# Patient Record
Sex: FEMALE | Race: BLACK OR AFRICAN AMERICAN | NOT HISPANIC OR LATINO | Employment: FULL TIME | URBAN - METROPOLITAN AREA
[De-identification: names, ages, dates, MRNs, and addresses within clinical notes are randomized per-mention and may not be internally consistent; named-entity substitution may affect disease eponyms.]

---

## 2023-03-07 ENCOUNTER — HOSPITAL ENCOUNTER (EMERGENCY)
Facility: HOSPITAL | Age: 48
Discharge: HOME | End: 2023-03-07
Attending: FAMILY MEDICINE

## 2023-03-07 VITALS
WEIGHT: 138.88 LBS | SYSTOLIC BLOOD PRESSURE: 181 MMHG | HEART RATE: 87 BPM | TEMPERATURE: 98 F | RESPIRATION RATE: 16 BRPM | DIASTOLIC BLOOD PRESSURE: 94 MMHG | HEIGHT: 64 IN | OXYGEN SATURATION: 99 % | BODY MASS INDEX: 23.71 KG/M2

## 2023-03-07 DIAGNOSIS — M25.462 BILATERAL KNEE EFFUSIONS: ICD-10-CM

## 2023-03-07 DIAGNOSIS — M25.469 PAIN AND SWELLING OF KNEE: ICD-10-CM

## 2023-03-07 DIAGNOSIS — M25.569 PAIN AND SWELLING OF KNEE: ICD-10-CM

## 2023-03-07 DIAGNOSIS — M25.461 BILATERAL KNEE EFFUSIONS: ICD-10-CM

## 2023-03-07 DIAGNOSIS — M25.50 POLYARTHRALGIA: Primary | ICD-10-CM

## 2023-03-07 DIAGNOSIS — D64.9 ANEMIA, UNSPECIFIED TYPE: ICD-10-CM

## 2023-03-07 LAB
ALBUMIN SERPL-MCNC: 2.5 G/DL (ref 3.5–5)
ALBUMIN/GLOB SERPL: 0.5 RATIO (ref 1.1–2)
ALP SERPL-CCNC: 66 UNIT/L (ref 40–150)
ALT SERPL-CCNC: <5 UNIT/L (ref 0–55)
AST SERPL-CCNC: 8 UNIT/L (ref 5–34)
BASOPHILS # BLD AUTO: 0.02 X10(3)/MCL (ref 0–0.2)
BASOPHILS NFR BLD AUTO: 0.2 %
BILIRUBIN DIRECT+TOT PNL SERPL-MCNC: 0.3 MG/DL
BUN SERPL-MCNC: 13 MG/DL (ref 7–18.7)
CALCIUM SERPL-MCNC: 9.4 MG/DL (ref 8.4–10.2)
CHLORIDE SERPL-SCNC: 109 MMOL/L (ref 98–107)
CO2 SERPL-SCNC: 24 MMOL/L (ref 22–29)
CREAT SERPL-MCNC: 0.78 MG/DL (ref 0.55–1.02)
CRP SERPL-MCNC: 99.6 MG/L
EOSINOPHIL # BLD AUTO: 0.08 X10(3)/MCL (ref 0–0.9)
EOSINOPHIL NFR BLD AUTO: 0.8 %
ERYTHROCYTE [DISTWIDTH] IN BLOOD BY AUTOMATED COUNT: 18.6 % (ref 11.5–17)
ERYTHROCYTE [SEDIMENTATION RATE] IN BLOOD: 103 MM/HR (ref 0–20)
GFR SERPLBLD CREATININE-BSD FMLA CKD-EPI: >60 MLS/MIN/1.73/M2
GLOBULIN SER-MCNC: 4.8 GM/DL (ref 2.4–3.5)
GLUCOSE SERPL-MCNC: 99 MG/DL (ref 74–100)
HCT VFR BLD AUTO: 29.6 % (ref 37–47)
HGB BLD-MCNC: 8.9 G/DL (ref 12–16)
IMM GRANULOCYTES # BLD AUTO: 0.03 X10(3)/MCL (ref 0–0.04)
IMM GRANULOCYTES NFR BLD AUTO: 0.3 %
LYMPHOCYTES # BLD AUTO: 1.27 X10(3)/MCL (ref 0.6–4.6)
LYMPHOCYTES NFR BLD AUTO: 13 %
MCH RBC QN AUTO: 23 PG
MCHC RBC AUTO-ENTMCNC: 30.1 G/DL (ref 33–36)
MCV RBC AUTO: 76.5 FL (ref 80–94)
MONOCYTES # BLD AUTO: 0.42 X10(3)/MCL (ref 0.1–1.3)
MONOCYTES NFR BLD AUTO: 4.3 %
NEUTROPHILS # BLD AUTO: 7.96 X10(3)/MCL (ref 2.1–9.2)
NEUTROPHILS NFR BLD AUTO: 81.4 %
NRBC BLD AUTO-RTO: 0 %
PLATELET # BLD AUTO: 591 X10(3)/MCL (ref 130–400)
PMV BLD AUTO: 8.7 FL (ref 7.4–10.4)
POTASSIUM SERPL-SCNC: 3.4 MMOL/L (ref 3.5–5.1)
PROT SERPL-MCNC: 7.3 GM/DL (ref 6.4–8.3)
RBC # BLD AUTO: 3.87 X10(6)/MCL (ref 4.2–5.4)
SODIUM SERPL-SCNC: 141 MMOL/L (ref 136–145)
WBC # SPEC AUTO: 9.8 X10(3)/MCL (ref 4.5–11.5)

## 2023-03-07 PROCEDURE — 99284 EMERGENCY DEPT VISIT MOD MDM: CPT

## 2023-03-07 PROCEDURE — 63600175 PHARM REV CODE 636 W HCPCS: Performed by: PHYSICIAN ASSISTANT

## 2023-03-07 PROCEDURE — 85025 COMPLETE CBC W/AUTO DIFF WBC: CPT | Performed by: PHYSICIAN ASSISTANT

## 2023-03-07 PROCEDURE — 80053 COMPREHEN METABOLIC PANEL: CPT | Performed by: PHYSICIAN ASSISTANT

## 2023-03-07 PROCEDURE — 85651 RBC SED RATE NONAUTOMATED: CPT | Performed by: PHYSICIAN ASSISTANT

## 2023-03-07 PROCEDURE — 96372 THER/PROPH/DIAG INJ SC/IM: CPT | Performed by: PHYSICIAN ASSISTANT

## 2023-03-07 PROCEDURE — 86140 C-REACTIVE PROTEIN: CPT | Performed by: PHYSICIAN ASSISTANT

## 2023-03-07 RX ORDER — METHYLPREDNISOLONE 4 MG/1
TABLET ORAL
Qty: 1 EACH | Refills: 0 | Status: SHIPPED | OUTPATIENT
Start: 2023-03-07 | End: 2023-03-24 | Stop reason: ALTCHOICE

## 2023-03-07 RX ORDER — KETOROLAC TROMETHAMINE 10 MG/1
10 TABLET, FILM COATED ORAL EVERY 6 HOURS
Qty: 20 TABLET | Refills: 0 | Status: SHIPPED | OUTPATIENT
Start: 2023-03-07 | End: 2023-03-24 | Stop reason: ALTCHOICE

## 2023-03-07 RX ORDER — HYDROCODONE BITARTRATE AND ACETAMINOPHEN 10; 325 MG/1; MG/1
1 TABLET ORAL EVERY 6 HOURS PRN
Qty: 12 TABLET | Refills: 0 | Status: SHIPPED | OUTPATIENT
Start: 2023-03-07 | End: 2023-03-24 | Stop reason: ALTCHOICE

## 2023-03-07 RX ORDER — KETOROLAC TROMETHAMINE 30 MG/ML
30 INJECTION, SOLUTION INTRAMUSCULAR; INTRAVENOUS
Status: COMPLETED | OUTPATIENT
Start: 2023-03-07 | End: 2023-03-07

## 2023-03-07 RX ADMIN — KETOROLAC TROMETHAMINE 30 MG: 30 INJECTION, SOLUTION INTRAMUSCULAR; INTRAVENOUS at 09:03

## 2023-03-07 NOTE — DISCHARGE INSTRUCTIONS
Take all medications as prescribed.     Drink plenty of fluids and get a lot of rest.     Keep scheduled appointment on 3/24/23 at [facility] Medicine clinic at 9:30 AM.    Return to ER for any changes or worsening of symptoms.

## 2023-03-07 NOTE — ED PROVIDER NOTES
Encounter Date: 3/7/2023       History     Chief Complaint   Patient presents with    Knee Pain     PT W CO BILAT KNEE PAIN > 2 MONTHS.  DENIES INJURY.  NO PCP.     46 YO AAF in ER with complaints of multiple joint pains (both knees, both wrists/hands, both ankles) with swelling and stiffness X 2-3 months. She has been seen at 2 different urgent care facilities. [facility] in 01/23 and [facility] urgent care in 02/23. She has written notes from both places which share a concern for autoimmune issues and a request for PCP appt with [facility] internal medicine clinic. Pt has no PCP and takes no medications except the occasional Tylenol or Advil. Denies injury/trauma, fever, chills, chest pain, SOB, abdominal pain, N/V/D, HA or dizziness. No other complaints.     The history is provided by the patient.   Review of patient's allergies indicates:  No Known Allergies  History reviewed. No pertinent past medical history.  History reviewed. No pertinent surgical history.  History reviewed. No pertinent family history.  Social History     Tobacco Use    Smoking status: Former     Packs/day: 0.50     Types: Cigarettes    Smokeless tobacco: Never   Substance Use Topics    Drug use: Not Currently     Review of Systems   Constitutional:  Negative for chills and fever.   HENT:  Negative for congestion and sore throat.    Respiratory:  Negative for shortness of breath.    Cardiovascular:  Negative for chest pain.   Gastrointestinal:  Negative for abdominal pain, diarrhea, nausea and vomiting.   Genitourinary:  Negative for dysuria.   Musculoskeletal:  Positive for arthralgias, joint swelling and neck pain. Negative for back pain and neck stiffness.   Skin:  Negative for rash.   Neurological:  Negative for dizziness, weakness, light-headedness and headaches.   Hematological:  Does not bruise/bleed easily.   All other systems reviewed and are negative.    Physical Exam     Initial Vitals [03/07/23 0816]   BP Pulse Resp Temp SpO2    (!) 181/94 87 16 97.9 °F (36.6 °C) 99 %      MAP       --         Physical Exam    Nursing note and vitals reviewed.  Constitutional: She appears well-developed and well-nourished. She is not diaphoretic. No distress.   HENT:   Head: Normocephalic and atraumatic.   Nose: Nose normal.   Eyes: Conjunctivae are normal.   Cardiovascular:  Normal rate, regular rhythm, normal heart sounds and intact distal pulses.           Pulmonary/Chest: Breath sounds normal. No respiratory distress.   Musculoskeletal:      Right wrist: Swelling and tenderness present. No deformity or bony tenderness. Normal range of motion. Normal pulse.      Left wrist: Swelling and tenderness present. No deformity or bony tenderness. Normal range of motion. Normal pulse.      Right knee: Swelling and effusion present. No erythema, ecchymosis or crepitus. Normal range of motion. Tenderness present over the medial joint line. Normal pulse.      Left knee: Swelling and effusion present. No erythema, ecchymosis or crepitus. Normal range of motion. Tenderness present over the medial joint line. Normal pulse.      Right ankle: Swelling present. No deformity. Tenderness present over the lateral malleolus and medial malleolus. Normal range of motion. Normal pulse.      Left ankle: Swelling present. No deformity. Tenderness present over the lateral malleolus and medial malleolus. Normal range of motion. Normal pulse.      Right foot: Normal.      Left foot: Normal.      Comments: Ttp soft tissues of both hands and wrists with mild swelling noted, no signs of septic joints, normal ROM in all fields, decreased  strength BUE, bilateral knees with effusions and ttp medially, normal ROM in flexion and extension, no signs of septic joint, both ankles with mild swelling laterally with ttp in soft tissues, good pedal pulse BLE and normal capillary refill to BUE and BLE     Neurological: She is alert and oriented to person, place, and time. She has normal  strength. GCS score is 15. GCS eye subscore is 4. GCS verbal subscore is 5. GCS motor subscore is 6.   Skin: Skin is warm and dry. Capillary refill takes less than 2 seconds.   Psychiatric: She has a normal mood and affect.       ED Course   Procedures  Labs Reviewed   COMPREHENSIVE METABOLIC PANEL - Abnormal; Notable for the following components:       Result Value    Potassium Level 3.4 (*)     Chloride 109 (*)     Albumin Level 2.5 (*)     Globulin 4.8 (*)     Albumin/Globulin Ratio 0.5 (*)     All other components within normal limits   C-REACTIVE PROTEIN - Abnormal; Notable for the following components:    C-Reactive Protein 99.60 (*)     All other components within normal limits   SEDIMENTATION RATE, AUTOMATED - Abnormal; Notable for the following components:    Sed Rate 103 (*)     All other components within normal limits   CBC WITH DIFFERENTIAL - Abnormal; Notable for the following components:    RBC 3.87 (*)     Hgb 8.9 (*)     Hct 29.6 (*)     MCV 76.5 (*)     MCHC 30.1 (*)     RDW 18.6 (*)     Platelet 591 (*)     All other components within normal limits   CBC W/ AUTO DIFFERENTIAL    Narrative:     The following orders were created for panel order CBC auto differential.  Procedure                               Abnormality         Status                     ---------                               -----------         ------                     CBC with Differential[[id number]]        Abnormal            Final result                 Please view results for these tests on the individual orders.          Imaging Results              X-Ray Knee 3 View Bilateral (Final result)  Result time 03/07/23 10:15:44      Final result by Carlos Cleveland MD (03/07/23 10:15:44)                   Impression:      Mild degenerative changes of the knees with bilateral joint effusions.      Electronically signed by: Carlos Cleveland  Date:    03/07/2023  Time:    10:15               Narrative:    EXAMINATION:  XR KNEE 3 VIEW  BILATERAL    CLINICAL HISTORY:  Pain in unspecified knee    COMPARISON:  None    FINDINGS:  Three views bilateral knees.  There is no fracture or dislocation.  Mild degenerative changes bilateral knees but no significant joint space narrowing.  There appear to be moderate bilateral suprapatellar effusions.                                       Medications   ketorolac injection 30 mg (30 mg Intramuscular Given 3/7/23 0934)                 ED Course as of 03/07/23 1124   Tue Mar 07, 2023   1045 VSS, NAD, pt is non-toxic or ill appearing, labs and imaging reviewed with pt, pt noted to have significantly elevated inflammatory markers today concerning for autoimmune disease, no signs of septic joint on exam, pt has upcoming IM appointment on 3/24/23 with Katharina Lundberg NP, will give Rx for NSAIDs and steroids today to help with pain, treatment plan and discharge instructions including follow up discussed, pt verbalized understanding, all questions answered, pt is stable and ready for discharge  [TT]      ED Course User Index  [TT] CRISTY Weston                 Clinical Impression:   Final diagnoses:  [M25.569, M25.469] Pain and swelling of knee  [M25.50] Polyarthralgia (Primary)  [M25.461, M25.462] Bilateral knee effusions  [D64.9] Anemia, unspecified type        ED Disposition Condition    Discharge Stable          ED Prescriptions       Medication Sig Dispense Start Date End Date Auth. Provider    methylPREDNISolone (MEDROL DOSEPACK) 4 mg tablet Take as package instructs 1 each 3/7/2023 -- CRISTY Weston    ketorolac (TORADOL) 10 mg tablet Take 1 tablet (10 mg total) by mouth every 6 (six) hours. for 5 days 20 tablet 3/7/2023 3/12/2023 CRISTY Weston    HYDROcodone-acetaminophen (NORCO)  mg per tablet Take 1 tablet by mouth every 6 (six) hours as needed for Pain. 12 tablet 3/7/2023 -- CRISTY Weston          Follow-up Information       Follow up With Specialties Details Why Contact Info [facility]  [place] [facility] Emergency Medicine In 3 days As needed, If symptoms worsen [address] [place] [id number] [phone]    Katharina Price, [facility] Go on 3/24/2023 at 9:30AM [address] [facility] [address]  [phone]               CRISTY Weston  03/07/23 1124

## 2023-03-24 ENCOUNTER — OFFICE VISIT (OUTPATIENT)
Dept: ORTHOPEDICS | Facility: CLINIC | Age: 48
End: 2023-03-24

## 2023-03-24 ENCOUNTER — LAB VISIT (OUTPATIENT)
Dept: LAB | Facility: HOSPITAL | Age: 48
End: 2023-03-24

## 2023-03-24 ENCOUNTER — OFFICE VISIT (OUTPATIENT)
Dept: INTERNAL MEDICINE | Facility: CLINIC | Age: 48
End: 2023-03-24

## 2023-03-24 VITALS
HEART RATE: 91 BPM | DIASTOLIC BLOOD PRESSURE: 108 MMHG | OXYGEN SATURATION: 100 % | TEMPERATURE: 98 F | SYSTOLIC BLOOD PRESSURE: 142 MMHG | WEIGHT: 114.19 LBS | BODY MASS INDEX: 19.5 KG/M2 | RESPIRATION RATE: 18 BRPM | HEIGHT: 64 IN

## 2023-03-24 VITALS — WEIGHT: 114 LBS | BODY MASS INDEX: 19.46 KG/M2 | HEIGHT: 64 IN

## 2023-03-24 DIAGNOSIS — I10 PRIMARY HYPERTENSION: ICD-10-CM

## 2023-03-24 DIAGNOSIS — Z13.0 SCREENING FOR IRON DEFICIENCY ANEMIA: ICD-10-CM

## 2023-03-24 DIAGNOSIS — M25.461 BILATERAL KNEE EFFUSIONS: Primary | ICD-10-CM

## 2023-03-24 DIAGNOSIS — R79.82 ELEVATED C-REACTIVE PROTEIN (CRP): ICD-10-CM

## 2023-03-24 DIAGNOSIS — Z11.59 SCREENING FOR VIRAL DISEASE: ICD-10-CM

## 2023-03-24 DIAGNOSIS — M25.50 POLYARTHRALGIA: ICD-10-CM

## 2023-03-24 DIAGNOSIS — Z13.220 SCREENING FOR LIPID DISORDERS: ICD-10-CM

## 2023-03-24 DIAGNOSIS — R70.0 ELEVATED SED RATE: ICD-10-CM

## 2023-03-24 DIAGNOSIS — Z11.4 SCREENING FOR HIV (HUMAN IMMUNODEFICIENCY VIRUS): ICD-10-CM

## 2023-03-24 DIAGNOSIS — Z13.21 ENCOUNTER FOR VITAMIN DEFICIENCY SCREENING: ICD-10-CM

## 2023-03-24 DIAGNOSIS — M25.462 BILATERAL KNEE EFFUSIONS: ICD-10-CM

## 2023-03-24 DIAGNOSIS — Z13.29 SCREENING FOR THYROID DISORDER: ICD-10-CM

## 2023-03-24 DIAGNOSIS — M25.50 POLYARTHRALGIA: Primary | ICD-10-CM

## 2023-03-24 DIAGNOSIS — R53.1 RAPIDLY PROGRESSIVE WEAKNESS: ICD-10-CM

## 2023-03-24 DIAGNOSIS — M25.461 BILATERAL KNEE EFFUSIONS: ICD-10-CM

## 2023-03-24 DIAGNOSIS — M25.462 BILATERAL KNEE EFFUSIONS: Primary | ICD-10-CM

## 2023-03-24 DIAGNOSIS — Z11.3 ROUTINE SCREENING FOR STI (SEXUALLY TRANSMITTED INFECTION): ICD-10-CM

## 2023-03-24 DIAGNOSIS — Z13.1 SCREENING FOR DIABETES MELLITUS: ICD-10-CM

## 2023-03-24 LAB
ALBUMIN SERPL-MCNC: 2.7 G/DL (ref 3.5–5)
ALBUMIN/GLOB SERPL: 0.5 RATIO (ref 1.1–2)
ALP SERPL-CCNC: 69 UNIT/L (ref 40–150)
ALT SERPL-CCNC: <5 UNIT/L (ref 0–55)
AST SERPL-CCNC: 12 UNIT/L (ref 5–34)
BASOPHILS # BLD AUTO: 0.02 X10(3)/MCL (ref 0–0.2)
BASOPHILS NFR BLD AUTO: 0.3 %
BILIRUBIN DIRECT+TOT PNL SERPL-MCNC: 0.3 MG/DL
BUN SERPL-MCNC: 8.8 MG/DL (ref 7–18.7)
C3 SERPL-MCNC: 136 MG/DL (ref 80–173)
C4 SERPL-MCNC: 23.7 MG/DL (ref 13–46)
CALCIUM SERPL-MCNC: 9.9 MG/DL (ref 8.4–10.2)
CHLORIDE SERPL-SCNC: 104 MMOL/L (ref 98–107)
CHOLEST SERPL-MCNC: 227 MG/DL
CHOLEST/HDLC SERPL: 8 {RATIO} (ref 0–5)
CLARITY BODY FLUID (OHS): NORMAL
CO2 SERPL-SCNC: 24 MMOL/L (ref 22–29)
COLOR BODY FLUID (OHS): NORMAL
CREAT FLD-MCNC: 0.63 MG/DL
CREAT SERPL-MCNC: 0.83 MG/DL (ref 0.55–1.02)
CRP SERPL-MCNC: 109.9 MG/L
DEPRECATED CALCIDIOL+CALCIFEROL SERPL-MC: 7.1 NG/ML (ref 30–80)
EOSINOPHIL # BLD AUTO: 0.03 X10(3)/MCL (ref 0–0.9)
EOSINOPHIL NFR BLD AUTO: 0.4 %
ERYTHROCYTE [DISTWIDTH] IN BLOOD BY AUTOMATED COUNT: 18.9 % (ref 11.5–17)
ERYTHROCYTE [SEDIMENTATION RATE] IN BLOOD: >130 MM/HR (ref 0–20)
EST. AVERAGE GLUCOSE BLD GHB EST-MCNC: 102.5 MG/DL
FERRITIN SERPL-MCNC: 222.95 NG/ML (ref 4.63–204)
FOLATE SERPL-MCNC: 6.6 NG/ML (ref 7–31.4)
GFR SERPLBLD CREATININE-BSD FMLA CKD-EPI: >60 MLS/MIN/1.73/M2
GLOBULIN SER-MCNC: 5.6 GM/DL (ref 2.4–3.5)
GLUCOSE FLD-MCNC: 49 MG/DL
GLUCOSE SERPL-MCNC: 108 MG/DL (ref 74–100)
HAV IGM SERPL QL IA: NONREACTIVE
HBA1C MFR BLD: 5.2 %
HBV CORE AB SERPL QL IA: NONREACTIVE
HBV CORE IGM SERPL QL IA: REACTIVE
HBV SURFACE AB SER-ACNC: 0.43 MIU/ML
HBV SURFACE AB SERPL IA-ACNC: NONREACTIVE M[IU]/ML
HBV SURFACE AG SERPL QL IA: NONREACTIVE
HCT VFR BLD AUTO: 27.6 % (ref 37–47)
HCV AB SERPL QL IA: NONREACTIVE
HDLC SERPL-MCNC: 29 MG/DL (ref 35–60)
HGB BLD-MCNC: 8.3 G/DL (ref 12–16)
HIV 1+2 AB+HIV1 P24 AG SERPL QL IA: NONREACTIVE
IMM GRANULOCYTES # BLD AUTO: 0.03 X10(3)/MCL (ref 0–0.04)
IMM GRANULOCYTES NFR BLD AUTO: 0.4 %
IRON SATN MFR SERPL: 6 % (ref 20–50)
IRON SERPL-MCNC: 10 UG/DL (ref 50–170)
LDLC SERPL CALC-MCNC: 165 MG/DL (ref 50–140)
LYMPHOCYTE MANUAL BF (OHS): 5 %
LYMPHOCYTES # BLD AUTO: 0.95 X10(3)/MCL (ref 0.6–4.6)
LYMPHOCYTES NFR BLD AUTO: 13.5 %
MCH RBC QN AUTO: 21.9 PG (ref 27–31)
MCHC RBC AUTO-ENTMCNC: 30.1 G/DL (ref 33–36)
MCV RBC AUTO: 72.8 FL (ref 80–94)
MONOCYTE MANUAL BF (OHS): 2 %
MONOCYTES # BLD AUTO: 0.31 X10(3)/MCL (ref 0.1–1.3)
MONOCYTES NFR BLD AUTO: 4.4 %
NEUTROPHILS # BLD AUTO: 5.69 X10(3)/MCL (ref 2.1–9.2)
NEUTROPHILS MAN BF (OHS): 93 %
NEUTROPHILS NFR BLD AUTO: 81 %
NRBC BLD AUTO-RTO: 0 %
PATH REV: NORMAL
PLATELET # BLD AUTO: 707 X10(3)/MCL (ref 130–400)
PMV BLD AUTO: 8.8 FL (ref 7.4–10.4)
POTASSIUM SERPL-SCNC: 3.2 MMOL/L (ref 3.5–5.1)
PROT FLD-MCNC: 5 GM/DL
PROT SERPL-MCNC: 8.3 GM/DL (ref 6.4–8.3)
PTH-INTACT SERPL-MCNC: 36.4 PG/ML (ref 8.7–77)
RBC # BLD AUTO: 3.79 X10(6)/MCL (ref 4.2–5.4)
RBC COUNT BODY FLUID (OHS): NORMAL /UL
SODIUM SERPL-SCNC: 139 MMOL/L (ref 136–145)
T PALLIDUM AB SER QL: NONREACTIVE
T4 FREE SERPL-MCNC: 1.24 NG/DL (ref 0.7–1.48)
TIBC SERPL-MCNC: 171 UG/DL (ref 70–310)
TIBC SERPL-MCNC: 181 UG/DL (ref 250–450)
TRANSFERRIN SERPL-MCNC: 161 MG/DL (ref 180–382)
TRIGL SERPL-MCNC: 165 MG/DL (ref 37–140)
TSH SERPL-ACNC: 0.83 UIU/ML (ref 0.35–4.94)
URATE SERPL-MCNC: 4.2 MG/DL (ref 2.6–6)
VIT B12 SERPL-MCNC: 390 PG/ML (ref 213–816)
VLDLC SERPL CALC-MCNC: 33 MG/DL
WBC # FLD AUTO: NORMAL /UL
WBC # SPEC AUTO: 7 X10(3)/MCL (ref 4.5–11.5)

## 2023-03-24 PROCEDURE — 82570 ASSAY OF URINE CREATININE: CPT

## 2023-03-24 PROCEDURE — 87798 DETECT AGENT NOS DNA AMP: CPT | Mod: 90

## 2023-03-24 PROCEDURE — 84443 ASSAY THYROID STIM HORMONE: CPT

## 2023-03-24 PROCEDURE — 84439 ASSAY OF FREE THYROXINE: CPT

## 2023-03-24 PROCEDURE — 99214 PR OFFICE/OUTPT VISIT, EST, LEVL IV, 30-39 MIN: ICD-10-PCS | Mod: 25,S$PBB,, | Performed by: NURSE PRACTITIONER

## 2023-03-24 PROCEDURE — 83550 IRON BINDING TEST: CPT

## 2023-03-24 PROCEDURE — 86003 ALLG SPEC IGE CRUDE XTRC EA: CPT

## 2023-03-24 PROCEDURE — 87389 HIV-1 AG W/HIV-1&-2 AB AG IA: CPT

## 2023-03-24 PROCEDURE — 99205 OFFICE O/P NEW HI 60 MIN: CPT | Mod: S$PBB,,,

## 2023-03-24 PROCEDURE — 83516 IMMUNOASSAY NONANTIBODY: CPT | Mod: 90

## 2023-03-24 PROCEDURE — 82607 VITAMIN B-12: CPT

## 2023-03-24 PROCEDURE — 99205 PR OFFICE/OUTPT VISIT, NEW, LEVL V, 60-74 MIN: ICD-10-PCS | Mod: S$PBB,,,

## 2023-03-24 PROCEDURE — 99214 OFFICE O/P EST MOD 30 MIN: CPT | Mod: 25,S$PBB,, | Performed by: NURSE PRACTITIONER

## 2023-03-24 PROCEDURE — 36415 COLL VENOUS BLD VENIPUNCTURE: CPT

## 2023-03-24 PROCEDURE — 84550 ASSAY OF BLOOD/URIC ACID: CPT

## 2023-03-24 PROCEDURE — 87205 SMEAR GRAM STAIN: CPT

## 2023-03-24 PROCEDURE — 89060 EXAM SYNOVIAL FLUID CRYSTALS: CPT

## 2023-03-24 PROCEDURE — 99215 OFFICE O/P EST HI 40 MIN: CPT | Mod: PBBFAC,27,25

## 2023-03-24 PROCEDURE — 83970 ASSAY OF PARATHORMONE: CPT

## 2023-03-24 PROCEDURE — 86780 TREPONEMA PALLIDUM: CPT

## 2023-03-24 PROCEDURE — 85025 COMPLETE CBC W/AUTO DIFF WBC: CPT

## 2023-03-24 PROCEDURE — 82746 ASSAY OF FOLIC ACID SERUM: CPT

## 2023-03-24 PROCEDURE — 85651 RBC SED RATE NONAUTOMATED: CPT

## 2023-03-24 PROCEDURE — 84157 ASSAY OF PROTEIN OTHER: CPT

## 2023-03-24 PROCEDURE — 87070 CULTURE OTHR SPECIMN AEROBIC: CPT

## 2023-03-24 PROCEDURE — 80061 LIPID PANEL: CPT

## 2023-03-24 PROCEDURE — 82306 VITAMIN D 25 HYDROXY: CPT

## 2023-03-24 PROCEDURE — 86140 C-REACTIVE PROTEIN: CPT

## 2023-03-24 PROCEDURE — 80074 ACUTE HEPATITIS PANEL: CPT

## 2023-03-24 PROCEDURE — 86160 COMPLEMENT ANTIGEN: CPT

## 2023-03-24 PROCEDURE — 84560 ASSAY OF URINE/URIC ACID: CPT | Mod: 90

## 2023-03-24 PROCEDURE — 83036 HEMOGLOBIN GLYCOSYLATED A1C: CPT

## 2023-03-24 PROCEDURE — 80053 COMPREHEN METABOLIC PANEL: CPT

## 2023-03-24 PROCEDURE — 86706 HEP B SURFACE ANTIBODY: CPT

## 2023-03-24 PROCEDURE — 99213 OFFICE O/P EST LOW 20 MIN: CPT | Mod: PBBFAC,25 | Performed by: NURSE PRACTITIONER

## 2023-03-24 PROCEDURE — 89051 BODY FLUID CELL COUNT: CPT

## 2023-03-24 PROCEDURE — 86747 PARVOVIRUS ANTIBODY: CPT | Mod: 90

## 2023-03-24 PROCEDURE — 20610 DRAIN/INJ JOINT/BURSA W/O US: CPT | Mod: 50,PBBFAC | Performed by: NURSE PRACTITIONER

## 2023-03-24 PROCEDURE — 82728 ASSAY OF FERRITIN: CPT

## 2023-03-24 PROCEDURE — 82945 GLUCOSE OTHER FLUID: CPT

## 2023-03-24 PROCEDURE — 86036 ANCA SCREEN EACH ANTIBODY: CPT | Mod: 90

## 2023-03-24 PROCEDURE — 20610 LARGE JOINT ASPIRATION/INJECTION: BILATERAL KNEE: ICD-10-PCS | Mod: 50,S$PBB,, | Performed by: NURSE PRACTITIONER

## 2023-03-24 PROCEDURE — 86704 HEP B CORE ANTIBODY TOTAL: CPT

## 2023-03-24 RX ORDER — TRAMADOL HYDROCHLORIDE 50 MG/1
50 TABLET ORAL EVERY 6 HOURS PRN
Qty: 28 TABLET | Refills: 0 | Status: ON HOLD | OUTPATIENT
Start: 2023-03-24 | End: 2023-03-30 | Stop reason: HOSPADM

## 2023-03-24 RX ORDER — TRAMADOL HYDROCHLORIDE 50 MG/1
50 TABLET ORAL EVERY 6 HOURS PRN
Qty: 28 TABLET | Refills: 0 | Status: SHIPPED | OUTPATIENT
Start: 2023-03-24 | End: 2023-03-24

## 2023-03-24 RX ORDER — METHOCARBAMOL 750 MG/1
750 TABLET, FILM COATED ORAL 3 TIMES DAILY PRN
COMMUNITY
Start: 2023-01-25 | End: 2023-03-24

## 2023-03-24 RX ORDER — LIDOCAINE HYDROCHLORIDE 10 MG/ML
5 INJECTION, SOLUTION EPIDURAL; INFILTRATION; INTRACAUDAL; PERINEURAL
Status: COMPLETED | OUTPATIENT
Start: 2023-03-24 | End: 2023-03-24

## 2023-03-24 RX ORDER — AMLODIPINE BESYLATE 5 MG/1
5 TABLET ORAL DAILY
Qty: 90 TABLET | Refills: 1 | Status: SHIPPED | OUTPATIENT
Start: 2023-03-24 | End: 2023-03-24

## 2023-03-24 RX ORDER — AMLODIPINE BESYLATE 5 MG/1
5 TABLET ORAL DAILY
Qty: 90 TABLET | Refills: 1 | Status: ON HOLD | OUTPATIENT
Start: 2023-03-24 | End: 2023-03-30 | Stop reason: HOSPADM

## 2023-03-24 RX ADMIN — LIDOCAINE HYDROCHLORIDE 5 ML: 10 INJECTION, SOLUTION EPIDURAL; INFILTRATION; INTRACAUDAL; PERINEURAL at 10:03

## 2023-03-24 NOTE — PROGRESS NOTES
"  Subjective:       New patient .  Patient ID: Nela Shaver is a 47 y.o. female. Non-smoker. Employment HX: book keeper, currently employed.    Seen [facility] ortho for same DX since n/a.   Chief Complaint: Pain of the Left Knee and Pain of the Right Knee    HPI:    Bilateral L > R  pressure  global knee pain.   Injury: no known injury  Onset: several weeks ago worsening over time  Modifying Factors: worse with activity and improves with rest  Associated Symptoms: decreased ROM and swelling   Activity: normal activity without exercise or sports activity and pain severely interferes with ADLs   Previous Treatments:  RX NSAIDs without symptom relief  PMH: negative for contraindications for NSAID use  Family History: - OA    Note: New patient walk in after PCP request assistance with bilateral joint drainage s/t severe ROM, pain.  Patient with history of diffuse joint pain.  PCP requesting drainage with lab testing for work up.      Current Outpatient Medications:     amLODIPine (NORVASC) 5 MG tablet, Take 1 tablet (5 mg total) by mouth once daily., Disp: 90 tablet, Rfl: 1    traMADoL (ULTRAM) 50 mg tablet, Take 1 tablet (50 mg total) by mouth every 6 (six) hours as needed for Pain., Disp: 28 tablet, Rfl: 0    Current Facility-Administered Medications:     LIDOcaine (PF) 10 mg/ml (1%) injection 50 mg, 5 mL, Other, 1 time in Clinic/HOD, Rajani Marrufo NP    LIDOcaine (PF) 10 mg/ml (1%) injection 50 mg, 5 mL, Other, 1 time in Clinic/HOD, Rajani Marrufo NP  Review of patient's allergies indicates:  No Known Allergies  No results found for: HGBA1C   Body mass index is 19.57 kg/m².   Vitals:    03/24/23 1153 03/24/23 1154   Weight: 51.7 kg (114 lb)    Height: 5' 4" (1.626 m)    PainSc:  10-Worst pain ever      Review of Systems:  A ten-point review of systems was performed and is negative, except as mentioned above   Objective:   MSK Bilateral Knee  General:  No apparent distress, no pain indicators, well nourished "   Inspection: moderate effusion, partial weight bearing- wheelchair in use in clinic today , no erythema, no contusion, mild quad deconditioning, no varus deformity   Palpation: BILATERAL knees tenderness with palpation at medial joint line, peripatellar soft tissue, non-tender patellar tendon, tibial plateau  ROM:    Active Flexion / Extension (0-140):  3 / 90 painful (R)  2 / 85 painful (L)  Strength:   Flexion  4 / 5 (R)   4 / 5 (L)  Extension  4 / 5 (R)   4 / 5 (L)  Special Testing:  IT Band Testing  Karen's Test:    -- (R)  -- (L)  Effusion Testing  Ballotable Effusion:   + (R)  + (L)  Fluid Wave:    + (R)  + (L)  Patellar Testing  Patellar Grind:    -- (R)  -- (L)  Patellar Facet Pain:   -- (R)  -- (L)  Apprehension:    -- (R)  -- (L)  Meniscal Testing  Fiorella's test:     -- (R)  -- (L)  Thessaly's Test:      Not tested (R)  not tested (L)  Ligament Testing  ACL Anterior Drawer:   -- (R) -- (L)  PCL Posterior Drawer:   -- (R) -- (L)  LCL Varus Test:    -- (R) -- (L)  MCL Valgus Test:   -- (R) -- (L)  Hip Exam normal  Ankle Exam normal  Neurovascular: Intact to light touch  Neuro/ Psych: Awake, alert, oriented, normal mood and affect  Lymphatic: No LAD  Skin/ Soft Tissue: no rash, skin intact  Assessment:       Imaging: X-ray 3 views of right and left knee dated 3/24/23 reviewed and independently interpreted by me.  Discussed with patient. Noted no acute, DJD noted.    XR KNEE 3 VIEW BILATERAL 3/7/23  CLINICAL HISTORY:  Pain in unspecified knee  COMPARISON:  None  FINDINGS:  Three views bilateral knees.  There is no fracture or dislocation.  Mild degenerative changes bilateral knees but no significant joint space narrowing.  There appear to be moderate bilateral suprapatellar effusions.  Impression:  Mild degenerative changes of the knees with bilateral joint effusions.    EMR Review: completed with noted  PCP visit 3/24/23 and ED visit 3/7/23    1. Bilateral knee effusions    2. Adult BMI <19 kg/sq m       Plan:       Orders Placed This Encounter    LIDOcaine (PF) 10 mg/ml (1%) injection 50 mg    LIDOcaine (PF) 10 mg/ml (1%) injection 50 mg     Ongoing education about DX and treatment recommendations including conservative treatments of daily HEP with TheraBand, muscle strengthening with use of stationary bike (RPM set at 80 or > with slow progression to goal of 40 minutes 3-4 times per week as tolerated), adequate vit D/C, glucosamine 1500 mg/day and daily acetaminophen 1000 mg 3 times/ day if able to tolerate.    Treatment plan: Mild knee arthritis with moderate joint effusion Bilateral L > R Refer back to PCP. Symptoms suggest inflammatory arthropathy; recommend rheumatological workup to include minimum: CBC/D, CMP, Uric acid, UA, ESR, CRP, RF, anti-CCP, MAGED, HLAB 27. If laboratory findings suggestive of inflammatory arthropathy recommend referral to Rheumatology for definitive diagnosis and treatment options.  I recommend bilateral knee joint drainage with lab workup ordered.    Procedure: Drainage of bilateral knees discussed, s/t failed conservative treatments patient consents to Drainage for diagnostic purposes today.    RX Medications: continue medications as RX per PCP  RTC: PRN if symptoms worsen or return  NOTE:  PCP will f/u with lab results and referral to RA if found to be appropriate. Patient verbalized understanding and denies questions.     Rajani Marrufo FNP    Large Joint Aspiration/Injection: bilateral knee    Date/Time: 3/24/2023 10:30 AM  Performed by: Rajani Marrufo NP  Authorized by: Rajani Marrufo NP     Consent Done?:  Yes (Written)  Indications:  Pain and joint swelling  Site marked: the procedure site was marked    Timeout: prior to procedure the correct patient, procedure, and site was verified      Local anesthesia used?: Yes    Local anesthetic:  Topical anesthetic    Details:  Needle Size:  18 G  Approach:  Anterolateral  Location:  Knee  Laterality:   Bilateral  Site:  Bilateral knee  Medications (Right):  5 mL Lidocaine 1% (PF) 5 mL  Aspirate (Right) amount (mL):  20  Aspirate (Right):  Bloody  Medications (Left):  5 mL Lidocaine 1% (PF) 5 mL  Aspirate (Left) amount (mL):  30  Aspirate (Left):  Bloody  Patient tolerance:  Patient tolerated the procedure well with no immediate complications     Ortho Procedure Note   Procedure: Bilateral  Knee Drainage Only lateral suprapatellar approach   Date: 03/24/2023  Time: 10:30 AM CDT   Indications: Therapeutic Indication - Decrease pain, Increase range of motion and improve quality of life:   Risks:  Possible complications with the injection include bleeding, infection (.01%), tendon rupture, fat pad or soft tissue atrophy, allergic reaction to medications and vasovagal response. Consent:  Procedure, risks, benefits, and alternatives explained the patient, who voiced understanding and agreed to proceed with procedure.  Consent signed and scanned into the medical record.     Staff: Rajani BRUNSON  Description:  Time-out performed.  The patient was prepped in normal sterile fashion use of chlorhexidine scrub and the appropriate and anatomic landmarks were identified.  Sterile 18 gauge 1.5 inch  was used. Topical ethyl chloride was applied.  Complications: None   EBL: None   Post Procedure: Patient alert, and moving all extremities. ROM improved, pain decreased.  Good peripheral pulses, no signs of vascular compromise and range of motion intact.  Aftercare instructions were given to patient at time of discharge.  Relative rest for 3 days-avoiding excess activity.  Place ice on the area for 15 minutes every 4-6 hours. Patient may take Tylenol a 1000 mg b.i.d. or ibuprofen 600 mg t.i.d. for the next 3-4 days if not on medication already and safe to take pending co-morbidities.  Protect the area for the next 1-8 hours if anesthetic was used.  Avoid excessive activity for the next 3-4 weeks.  ER precautions given for  fever, severe joint pain or allergic reaction or other new symptoms related to the joint injection.         Timed Billing Note  Total Time Spent with Patient: 30 minutes Excludes Time Spent Performing Procedure  Visit Start Time: 1200  10 minutes spent prior to visit reviewing EMR, prior labs and x-rays.  10 minutes spent in visit with patient face-to-face time completing exam, obtaining history, educating on DX and treatment plan.  10 minutes spent after visit completing EMR documentation.   Visit End Time: 1230

## 2023-03-24 NOTE — PROGRESS NOTES
PATIENT NAME: Nela Shaver  : 1975  DATE: 3/24/23  MRN: [id number]          Reason for Visit/Chief Complaint   Establish Care, Knee Pain (Bilateral), Depression, Anxiety, and Extremity Weakness       History of Present Illness (HPI)     Nela Shaver is a 47 y.o. Black female presenting in clinic today Establish Care, Knee Pain (Bilateral), Depression, Anxiety, and Extremity Weakness. No previous PCP. No significant medical history. Raoul (son) is present during visit.    Patient presented to [facility] ED on 3/7/2023 with complaints of multiple joint pains (both knees, both wrists/hands, both ankles) with swelling and stiffness X 2-3 months. She was previously seen at 2 different urgent care facilities. [facility] in  and [facility] urgent care in . She states that the urgent cares she went to had concerns of an autoimmune issue. She denies any family history of autoimmune diseases. In the ED, CRP-99.60, Sed rate-103, K-3.4, Platelets-591. Bilateral knee xrays revealed: Three views bilateral knees. There is no fracture or dislocation.  Mild degenerative changes bilateral knees but no significant joint space narrowing.  There appear to be moderate bilateral suprapatellar effusions. Impression: Mild degenerative changes of the knees with bilateral joint effusions. She was given ketorolac IM, prescribed medrol dose pack, ketorolac by mouth, and norco. She is a poor historian. She states in 10/2022, she woke up one morning with back pain, but the pain has gotten progressively worse. In January, she states the pain started to affect her ability to walk and that is when all of the weakness started. She presents in a wheelchair today. She states she does not have a wheelchair at home and just lays in bed. She reports having urinary accidents due to unable to make it to the restroom quick enough. She reports joint stiffness through out the day. 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 She is voicing concern of potentially losing her job. She states she does not have any benefits there. She reports when she goes to work, she has to take Ibuprofen 600 mg every 4 hours for the pain. She also reports pain in her hands. She states at the beginning of the month, she was able to open a bottle of water, but now she is unable to. In 10/2022 when this started, she denies any travel (locally or abroad), denies any insect bites, falls, injuries.     BP, 171/118, 142/108 (manual) not at goal. She denies HA, CP, SOB, dizziness, or vision changes. She states at the different urgent cares she visited, she has been hypertensive, but she did not have a PCP to follow up.     PHQ9-12 and GAD7-7. She denies SI/HI. She is very concerned about her condition, she stated "I feel like I am dying." Denies counseling, psychiatry, or medication management at this time.     Former cigarette smoker, denies alcohol use, denies illicit drug use. Denies chest pain, shortness of breath, cough, headache, dizziness, weakness, abdominal pain, nausea, vomiting, diarrhea, constipation, dysuria, SI, and HI.    Pt has 1 or more chronic illnesses with severe exacerbation, progression, or side effects of treatment / 1 acute or chronic illness or injury that poses a threat to life or bodily function.  I have reviewed prior external notes / reviewed results of each unique test /ordered a unique test /  This assessment required an independent historian.  Management of this patient was discussed with an external physician / other qhp / or appropriate source that was not separately reported.  There is high risk of morbidity from additional diagnostic testing or treatment including prescription drug management. Diagnosis & treatment are significantly limited by social determinants of health.   I spent a total of 77 minutes reviewing the patient's chart prior to our face to face time, seeing the patient, speaking with nurse, " "and documenting in the record.  (Npt 60-74 mins / Est 40-54 mins)        Review of Systems     Review of Systems   Constitutional: Negative.    HENT: Negative.  Negative for trouble swallowing.    Eyes: Negative.    Respiratory: Negative.     Cardiovascular: Negative.    Gastrointestinal: Negative.    Endocrine: Negative.    Genitourinary: Negative.    Musculoskeletal:  Positive for arthralgias, back pain, gait problem, joint swelling, neck pain and neck stiffness.   Skin: Negative.    Allergic/Immunologic: Negative.    Neurological:  Positive for weakness. Negative for dizziness, speech difficulty and headaches.   Hematological: Negative.    Psychiatric/Behavioral:  Positive for dysphoric mood. Negative for self-injury and suicidal ideas. The patient is nervous/anxious.    All other systems reviewed and are negative.    Medical / Social / Family History   History reviewed. No pertinent past medical history.      History reviewed. No pertinent surgical history.      Social History  Nela Shaver's  reports that she has been smoking cigarettes. She has been smoking an average of .5 packs per day. She has never used smokeless tobacco. She reports that she does not currently use alcohol. She reports that she does not use drugs.    Family History  Nela Shaver's family history includes Hyperlipidemia in her mother.    Medications and Allergies     Medications  Current Outpatient Medications   Medication Instructions    HYDROcodone-acetaminophen (NORCO)  mg per tablet 1 tablet, Oral, Every 6 hours PRN    methocarbamoL (ROBAXIN) 750 mg, Oral, 3 times daily PRN    methylPREDNISolone (MEDROL DOSEPACK) 4 mg tablet Take as package instructs       Allergies  Review of patient's allergies indicates:  No Known Allergies    Physical Examination   Visit Vitals  BP (!) 142/108 (BP Location: Left arm, Patient Position: Sitting, BP Method: Small (Manual))   Pulse 91   Temp 98.2 °F (36.8 °C) (Oral)   Resp 18   Ht 5' 4" (1.626 m) "   Wt 51.8 kg (114 lb 3.2 oz)   LMP 02/27/2023   SpO2 100%   BMI 19.60 kg/m²     Physical Exam  Vitals reviewed.   Constitutional:       Appearance: Normal appearance. She is normal weight. She is ill-appearing.   HENT:      Head: Normocephalic and atraumatic.      Right Ear: External ear normal.      Left Ear: External ear normal.      Nose: Nose normal.      Mouth/Throat:      Mouth: Mucous membranes are moist.      Pharynx: Oropharynx is clear.   Eyes:      Extraocular Movements: Extraocular movements intact.      Conjunctiva/sclera: Conjunctivae normal.      Pupils: Pupils are equal, round, and reactive to light.   Cardiovascular:      Rate and Rhythm: Normal rate and regular rhythm.      Pulses: Normal pulses.      Heart sounds: Normal heart sounds.   Pulmonary:      Effort: Pulmonary effort is normal.      Breath sounds: Normal breath sounds.   Abdominal:      General: Bowel sounds are normal.      Palpations: Abdomen is soft.   Musculoskeletal:      Right wrist: No crepitus. Decreased range of motion.      Left wrist: No crepitus. Decreased range of motion.      Right hand: Swelling and tenderness present. Decreased range of motion. Decreased strength. Normal capillary refill. Normal pulse.      Left hand: Swelling and tenderness present. Decreased range of motion. Decreased strength. Normal capillary refill. Normal pulse.      Cervical back: Normal range of motion and neck supple.      Right knee: Swelling and effusion present. No crepitus. Normal range of motion. Tenderness present.      Left knee: Swelling and effusion present. No crepitus. Normal range of motion. Tenderness present.      Comments: Wheelchair. Bilateral knees warm to touch. Able to have full extension of knees with passive ROM.    Skin:     General: Skin is warm and dry.      Capillary Refill: Capillary refill takes less than 2 seconds.   Neurological:      General: No focal deficit present.      Mental Status: She is alert and oriented to  person, place, and time.   Psychiatric:         Mood and Affect: Mood normal. Affect is tearful.         Behavior: Behavior normal.         Thought Content: Thought content normal.         Judgment: Judgment normal.         Results     Lab Results   Component Value Date    WBC 9.8 03/07/2023    RBC 3.87 (L) 03/07/2023    HGB 8.9 (L) 03/07/2023    HCT 29.6 (L) 03/07/2023    MCV 76.5 (L) 03/07/2023    MCH 23.0 03/07/2023    MCHC 30.1 (L) 03/07/2023    RDW 18.6 (H) 03/07/2023     (H) 03/07/2023    MPV 8.7 03/07/2023      Lab Results   Component Value Date     03/07/2023    K 3.4 (L) 03/07/2023    CHLORIDE 109 (H) 03/07/2023    CO2 24 03/07/2023    GLUCOSE 99 03/07/2023    BUN 13.0 03/07/2023    CREATININE 0.78 03/07/2023    LABPROT 7.3 03/07/2023    ALBUMIN 2.5 (L) 03/07/2023    BILITOT 0.3 03/07/2023    ALKPHOS 66 03/07/2023    AST 8 03/07/2023    ALT <5 03/07/2023    EGFRNORACEVR >60 03/07/2023     No results found for: TSH, T4FREE  No results found for: CHOL, HDL, LDL, TRIG  No results found for: COLORUA, SGUA, PHURINE, PROTEINUA, GLUCOSEUA, BILIRUBINUA, BLOODUA, WBCUA, RBCUA, BACTERIA, NITRITESUA, LEUKOCYTESUR, UROBILINOGEN, PHURINE  No results found for: MICROALBCREA, CREATRANDUR, MICALBCREAT  No results found for: YOFOOTKV72SH, V12, FOLATE  No results found for: HIV, HEPAIGM, HEPBCOREM, HEPBSAG, HEPCAB  No results found for: FITDIAG, COLOGUARD  No results found for: OCCBLDIA    Assessment and Plan (including Health Maintenance)     1. Polyarthralgia  - CBC Auto Differential; Future  - Comprehensive Metabolic Panel; Future  - Microalbumin/Creatinine Ratio, Urine; Future  - Urinalysis, Reflex to Urine Culture; Future  - CYCLIC CITRULLINATED PEPTIDE (CCP) ANTIBODY; Future  - Uric Acid; Future  - C4 Complement; Future  - Rheumatoid Factors, IgA, IgG, IgM; Future  - PTH, Intact; Future  - Parvovirus B19 Antibody, IgG and IgM; Future  - Lyme disease DNA probe, direct; Future  - Hepatitis B Surface Ab,  Qualitative; Future  - Hepatitis B Core Antibody, Total; Future  - Anti-Neutrophilic Cytoplasmic Antibody; Future  - Vitamin B12; Future  - Folate; Future  - C3 Complement; Future  - Ambulatory referral/consult to Orthopedics; Future  - traMADoL (ULTRAM) 50 mg tablet; Take 1 tablet (50 mg total) by mouth every 6 (six) hours as needed for Pain.  Dispense: 28 tablet; Refill: 0  - Ambulatory referral/consult to Outpatient Case Management  Will refer to appropriate specialties when labs are resulted.    2. Primary hypertension  - amLODIPine (NORVASC) 5 MG tablet; Take 1 tablet (5 mg total) by mouth once daily.  Dispense: 90 tablet; Refill: 1  - blood pressure monitor Kit; 1 each by Misc.(Non-Drug; Combo Route) route 2 (two) times a day.  Dispense: 1 each; Refill: 0  - Ambulatory referral/consult to Outpatient Case Management - (voucher for BP cuff).  BP Readings from Last 3 Encounters:   03/24/23 (!) 142/108   03/07/23 (!) 181/94      Not at goal. New diagnosis.  Follow a low sodium (less than 2 grams of sodium per day), DASH diet.   Rx amlodipine, take as prescribed.  Monitor blood pressure and report any consistent values greater than 140/90 and keep a log.  Encouraged smoking cessation to aid in BP reduction and co-morbidities.   Maintain healthy weight with a BMI goal of <30.   Aerobic exercise for 150 minutes per week (or 5 days a week for 30 minutes each day).     3. Rapidly progressive weakness  - CT Head Without Contrast; Future  - X-Ray Chest PA And Lateral; Future  - Ambulatory referral/consult to Outpatient Case Management - assistance at home, mobility devices.    4. Elevated C-reactive protein (CRP)  - C-Reactive Protein; Future - to be completed after clinic.    5. Elevated sed rate  - Sedimentation rate; Future - to be completed after clinic.    6. Bilateral knee effusions  - Ambulatory referral/consult to Orthopedics; Future  Consulted with Dr. Huynh in ED, therapeutic knee joint drainage is not  performed in ED.   Consulted with BOY Giron (ortho) and agreed to do joint drainage with fluid samples to be sent to lab for further work up.   Patient and son was assisted to [facility] orthopedic clinic.    7. Body mass index (BMI) of 19.0 to 19.9 in adult  Body mass index is 19.6 kg/m². (At goal).     8. Routine screening for STI (sexually transmitted infection)  - Chlamydia/GC, PCR; Future  - SYPHILIS ANTIBODY (WITH REFLEX RPR); Future    9. Screening for diabetes mellitus  - Hemoglobin A1C; Future    10. Screening for HIV (human immunodeficiency virus)  - HIV 1/2 Ag/Ab (4th Gen); Future    11. Screening for viral disease  - Hepatitis Panel, Acute; Future    12. Screening for iron deficiency anemia  - Ferritin; Future  - Iron and TIBC; Future    13. Screening for lipid disorders  - Lipid Panel; Future    14. Screening for thyroid disorder  - T4, Free; Future  - TSH; Future    15. Encounter for vitamin deficiency screening  - Vitamin D; Future    16. Motor neuron disease, unspecified  - CT Head Without Contrast; Future      Health Maintenance Due   Topic Date Due    Hepatitis C Screening  Never done    Cervical Cancer Screening  Never done    Lipid Panel  Never done    COVID-19 Vaccine (1) Never done    HIV Screening  Never done    Mammogram  Never done    Colorectal Cancer Screening  Never done     Tests to Keep You Healthy    Mammogram: DUE  Colon Cancer Screening: DUE  Cervical Cancer Screening: DUE      Health Maintenance Topics with due status: Not Due       Topic Last Completion Date    Lipid Panel 03/24/2023       Future Appointments   Date Time Provider Department Center   4/10/2023 12:45 PM BOY Reaves [facility]        Follow up in about 2 weeks (around 4/7/2023) for F2F, Lab review, Follow up, Wellness.  RTC PRN.  Labs within a week of visit.        Signature:        BOY Reaves  [facility]  [address]  [facility] [id number]    Date of encounter: 3/24/23

## 2023-03-25 PROBLEM — I10 PRIMARY HYPERTENSION: Status: ACTIVE | Noted: 2023-03-25

## 2023-03-25 LAB
GRAM STN SPEC: NORMAL
GRAM STN SPEC: NORMAL

## 2023-03-25 RX ORDER — ACETAMINOPHEN 500 MG
1 TABLET ORAL 2 TIMES DAILY
Qty: 1 EACH | Refills: 0 | Status: SHIPPED | OUTPATIENT
Start: 2023-03-25

## 2023-03-27 ENCOUNTER — PATIENT OUTREACH (OUTPATIENT)
Dept: ADMINISTRATIVE | Facility: OTHER | Age: 48
End: 2023-03-27

## 2023-03-27 LAB
B BURGDOR DNA SPEC QL NAA+PROBE: NEGATIVE
B GAR+AFZ OPPA1 BLD QL NAA+NON-PROBE: NEGATIVE
B MAYONII OPPA1 BLD QL NAA+NON-PROBE: NEGATIVE
C-ANCA TITR SER IF: NEGATIVE {TITER}
CRYSTAL, BODY FLUID (OHS): NORMAL
CRYSTAL, BODY FLUID TYPE (OHS): NORMAL
MICROBIOLOGIST REVIEW: NORMAL
P-ANCA SER QL IF: NEGATIVE
RF IGA SER-ACNC: >100 UNITS
RF IGG SER-ACNC: 46 UNITS
RF IGM SER-ACNC: >100 UNITS
SLIDE CRYSTALS (OHS): NORMAL
SPECIMEN SOURCE: NORMAL
URATE FLD-MCNC: 3.5 MG/DL

## 2023-03-27 NOTE — PROGRESS NOTES
CHW - Case Closure    This Community Health Worker spoke to patient via telephone today.     Pt/Caregiver reported: Pt declined needing community resources     Pt/Caregiver denied any additional needs at this time and agrees with episode closure at this time.  Provided patient with Community Health Worker's contact information and encouraged him/her to contact this Community Health Worker if additional needs arise.

## 2023-03-27 NOTE — PROGRESS NOTES
"CHW - Initial Contact    This Community Health Worker updated the Social Determinant of Health questionnaire with patient via telephone today.      Pt identified barriers of most importance are: None during call Pt stated she doesn't need help with food transportation and etc. She was just in pain and wants to "be fixed" CHW offered to send an in-basket message to BOY Price and Call [facility] Nurse on Call Pt declined both. Pt stated she will wait until her appointment on wedsnday to address her pain.    Referrals to community agencies completed with patient/caregiver consent outside of [facility] include: n/a    Referrals were put through [facility] - n/a    Support and Services: No support & services have been documented.  Other information discussed the patient needs / wants help with: none    Follow up required: no    No future outreach task assigned     "

## 2023-03-28 ENCOUNTER — HOSPITAL ENCOUNTER (INPATIENT)
Facility: HOSPITAL | Age: 48
LOS: 2 days | Discharge: HOME | DRG: 546 | End: 2023-03-30
Attending: FAMILY MEDICINE | Admitting: INTERNAL MEDICINE

## 2023-03-28 DIAGNOSIS — I10 UNCONTROLLED HYPERTENSION: ICD-10-CM

## 2023-03-28 DIAGNOSIS — D75.839 THROMBOCYTOSIS: ICD-10-CM

## 2023-03-28 DIAGNOSIS — M06.9 RHEUMATOID ARTHRITIS FLARE: Primary | ICD-10-CM

## 2023-03-28 DIAGNOSIS — I10 PRIMARY HYPERTENSION: ICD-10-CM

## 2023-03-28 DIAGNOSIS — R29.898 WEAKNESS OF BOTH LOWER EXTREMITIES: ICD-10-CM

## 2023-03-28 DIAGNOSIS — R70.0 ELEVATED ERYTHROCYTE SEDIMENTATION RATE: ICD-10-CM

## 2023-03-28 DIAGNOSIS — D50.9 IRON DEFICIENCY ANEMIA, UNSPECIFIED IRON DEFICIENCY ANEMIA TYPE: ICD-10-CM

## 2023-03-28 DIAGNOSIS — R79.82 ELEVATED C-REACTIVE PROTEIN (CRP): ICD-10-CM

## 2023-03-28 LAB
ALBUMIN SERPL-MCNC: 2.3 G/DL (ref 3.5–5)
ALBUMIN/GLOB SERPL: 0.4 RATIO (ref 1.1–2)
ALP SERPL-CCNC: 101 UNIT/L (ref 40–150)
ALT SERPL-CCNC: 19 UNIT/L (ref 0–55)
APPEARANCE UR: CLEAR
AST SERPL-CCNC: 44 UNIT/L (ref 5–34)
B-HCG UR QL: NEGATIVE
B19V IGG SER QL IA: ABNORMAL
B19V IGG+IGM SER-IMP: ABNORMAL
B19V IGM SER QL IA: NEGATIVE
BACTERIA #/AREA URNS AUTO: ABNORMAL /HPF
BASOPHILS # BLD AUTO: 0.02 X10(3)/MCL (ref 0–0.2)
BASOPHILS NFR BLD AUTO: 0.3 %
BILIRUB UR QL STRIP.AUTO: NEGATIVE MG/DL
BILIRUBIN DIRECT+TOT PNL SERPL-MCNC: 0.4 MG/DL
BUN SERPL-MCNC: 8.4 MG/DL (ref 7–18.7)
CALCIUM SERPL-MCNC: 9.7 MG/DL (ref 8.4–10.2)
CHLORIDE SERPL-SCNC: 98 MMOL/L (ref 98–107)
CO2 SERPL-SCNC: 27 MMOL/L (ref 22–29)
COLOR UR AUTO: ABNORMAL
CREAT SERPL-MCNC: 0.69 MG/DL (ref 0.55–1.02)
CRP SERPL-MCNC: 202.2 MG/L
CTP QC/QA: YES
CYCLIC CITRULLINATED PEPTIDE (CCP) (OLG): POSITIVE
EOSINOPHIL # BLD AUTO: 0.05 X10(3)/MCL (ref 0–0.9)
EOSINOPHIL NFR BLD AUTO: 0.7 %
ERYTHROCYTE [DISTWIDTH] IN BLOOD BY AUTOMATED COUNT: 18.9 % (ref 11.5–17)
ERYTHROCYTE [SEDIMENTATION RATE] IN BLOOD: 73 MM/HR (ref 0–20)
GFR SERPLBLD CREATININE-BSD FMLA CKD-EPI: >60 MLS/MIN/1.73/M2
GLOBULIN SER-MCNC: 5.7 GM/DL (ref 2.4–3.5)
GLUCOSE SERPL-MCNC: 110 MG/DL (ref 74–100)
GLUCOSE UR QL STRIP.AUTO: NORMAL MG/DL
HCT VFR BLD AUTO: 25.9 % (ref 37–47)
HGB BLD-MCNC: 7.7 G/DL (ref 12–16)
HIV 1+2 AB+HIV1 P24 AG SERPL QL IA: NONREACTIVE
HYALINE CASTS #/AREA URNS LPF: ABNORMAL /LPF
IMM GRANULOCYTES # BLD AUTO: 0.02 X10(3)/MCL (ref 0–0.04)
IMM GRANULOCYTES NFR BLD AUTO: 0.3 %
KETONES UR QL STRIP.AUTO: ABNORMAL MG/DL
LEUKOCYTE ESTERASE UR QL STRIP.AUTO: 25 UNIT/L
LYMPHOCYTES # BLD AUTO: 0.97 X10(3)/MCL (ref 0.6–4.6)
LYMPHOCYTES NFR BLD AUTO: 14.3 %
MCH RBC QN AUTO: 21.2 PG (ref 27–31)
MCHC RBC AUTO-ENTMCNC: 29.7 G/DL (ref 33–36)
MCV RBC AUTO: 71.3 FL (ref 80–94)
MONOCYTES # BLD AUTO: 0.55 X10(3)/MCL (ref 0.1–1.3)
MONOCYTES NFR BLD AUTO: 8.1 %
MUCOUS THREADS URNS QL MICRO: ABNORMAL /LPF
NEUTROPHILS # BLD AUTO: 5.15 X10(3)/MCL (ref 2.1–9.2)
NEUTROPHILS NFR BLD AUTO: 76.3 %
NITRITE UR QL STRIP.AUTO: NEGATIVE
NRBC BLD AUTO-RTO: 0 %
PH UR STRIP.AUTO: 7.5 [PH]
PLATELET # BLD AUTO: 748 X10(3)/MCL (ref 130–400)
PMV BLD AUTO: 8.5 FL (ref 7.4–10.4)
POTASSIUM SERPL-SCNC: 3.3 MMOL/L (ref 3.5–5.1)
PROT SERPL-MCNC: 8 GM/DL (ref 6.4–8.3)
PROT UR QL STRIP.AUTO: ABNORMAL MG/DL
RBC # BLD AUTO: 3.63 X10(6)/MCL (ref 4.2–5.4)
RBC #/AREA URNS AUTO: ABNORMAL /HPF
RBC UR QL AUTO: NEGATIVE UNIT/L
SODIUM SERPL-SCNC: 135 MMOL/L (ref 136–145)
SP GR UR STRIP.AUTO: 1.01
SQUAMOUS #/AREA URNS LPF: ABNORMAL /HPF
UROBILINOGEN UR STRIP-ACNC: ABNORMAL MG/DL
WBC # SPEC AUTO: 6.8 X10(3)/MCL (ref 4.5–11.5)
WBC #/AREA URNS AUTO: ABNORMAL /HPF

## 2023-03-28 PROCEDURE — 63600175 PHARM REV CODE 636 W HCPCS: Performed by: PHYSICIAN ASSISTANT

## 2023-03-28 PROCEDURE — 96375 TX/PRO/DX INJ NEW DRUG ADDON: CPT

## 2023-03-28 PROCEDURE — 86235 NUCLEAR ANTIGEN ANTIBODY: CPT | Performed by: STUDENT IN AN ORGANIZED HEALTH CARE EDUCATION/TRAINING PROGRAM

## 2023-03-28 PROCEDURE — 86039 ANTINUCLEAR ANTIBODIES (ANA): CPT | Mod: 90 | Performed by: STUDENT IN AN ORGANIZED HEALTH CARE EDUCATION/TRAINING PROGRAM

## 2023-03-28 PROCEDURE — 80053 COMPREHEN METABOLIC PANEL: CPT | Performed by: PHYSICIAN ASSISTANT

## 2023-03-28 PROCEDURE — 99285 EMERGENCY DEPT VISIT HI MDM: CPT | Mod: 25

## 2023-03-28 PROCEDURE — 96374 THER/PROPH/DIAG INJ IV PUSH: CPT

## 2023-03-28 PROCEDURE — 87389 HIV-1 AG W/HIV-1&-2 AB AG IA: CPT | Performed by: STUDENT IN AN ORGANIZED HEALTH CARE EDUCATION/TRAINING PROGRAM

## 2023-03-28 PROCEDURE — 96376 TX/PRO/DX INJ SAME DRUG ADON: CPT

## 2023-03-28 PROCEDURE — 85651 RBC SED RATE NONAUTOMATED: CPT | Performed by: PHYSICIAN ASSISTANT

## 2023-03-28 PROCEDURE — 25000003 PHARM REV CODE 250: Performed by: STUDENT IN AN ORGANIZED HEALTH CARE EDUCATION/TRAINING PROGRAM

## 2023-03-28 PROCEDURE — 81001 URINALYSIS AUTO W/SCOPE: CPT | Performed by: PHYSICIAN ASSISTANT

## 2023-03-28 PROCEDURE — 25000003 PHARM REV CODE 250: Performed by: FAMILY MEDICINE

## 2023-03-28 PROCEDURE — 11000001 HC ACUTE MED/SURG PRIVATE ROOM

## 2023-03-28 PROCEDURE — 86140 C-REACTIVE PROTEIN: CPT | Performed by: PHYSICIAN ASSISTANT

## 2023-03-28 PROCEDURE — 85025 COMPLETE CBC W/AUTO DIFF WBC: CPT | Performed by: PHYSICIAN ASSISTANT

## 2023-03-28 PROCEDURE — 81025 URINE PREGNANCY TEST: CPT | Performed by: PHYSICIAN ASSISTANT

## 2023-03-28 PROCEDURE — 63600175 PHARM REV CODE 636 W HCPCS: Performed by: STUDENT IN AN ORGANIZED HEALTH CARE EDUCATION/TRAINING PROGRAM

## 2023-03-28 PROCEDURE — 25000003 PHARM REV CODE 250: Performed by: PHYSICIAN ASSISTANT

## 2023-03-28 RX ORDER — HYDRALAZINE HYDROCHLORIDE 20 MG/ML
10 INJECTION INTRAMUSCULAR; INTRAVENOUS EVERY 6 HOURS PRN
Status: DISCONTINUED | OUTPATIENT
Start: 2023-03-28 | End: 2023-03-30 | Stop reason: HOSPADM

## 2023-03-28 RX ORDER — AMLODIPINE BESYLATE 5 MG/1
5 TABLET ORAL DAILY
Status: DISCONTINUED | OUTPATIENT
Start: 2023-03-28 | End: 2023-03-30

## 2023-03-28 RX ORDER — METHYLPREDNISOLONE SOD SUCC 125 MG
125 VIAL (EA) INJECTION ONCE
Status: COMPLETED | OUTPATIENT
Start: 2023-03-28 | End: 2023-03-28

## 2023-03-28 RX ORDER — NALOXONE HCL 0.4 MG/ML
0.02 VIAL (ML) INJECTION
Status: DISCONTINUED | OUTPATIENT
Start: 2023-03-28 | End: 2023-03-30 | Stop reason: HOSPADM

## 2023-03-28 RX ORDER — IBUPROFEN 200 MG
24 TABLET ORAL
Status: DISCONTINUED | OUTPATIENT
Start: 2023-03-28 | End: 2023-03-28

## 2023-03-28 RX ORDER — OXYCODONE HYDROCHLORIDE 5 MG/1
5 TABLET ORAL EVERY 6 HOURS PRN
Status: DISCONTINUED | OUTPATIENT
Start: 2023-03-28 | End: 2023-03-29

## 2023-03-28 RX ORDER — DEXTROSE 40 %
15 GEL (GRAM) ORAL
Status: DISCONTINUED | OUTPATIENT
Start: 2023-03-28 | End: 2023-03-29

## 2023-03-28 RX ORDER — METHYLPREDNISOLONE SOD SUCC 125 MG
60 VIAL (EA) INJECTION 2 TIMES DAILY
Status: DISCONTINUED | OUTPATIENT
Start: 2023-03-29 | End: 2023-03-29

## 2023-03-28 RX ORDER — ENOXAPARIN SODIUM 100 MG/ML
40 INJECTION SUBCUTANEOUS EVERY 24 HOURS
Status: DISCONTINUED | OUTPATIENT
Start: 2023-03-28 | End: 2023-03-30 | Stop reason: HOSPADM

## 2023-03-28 RX ORDER — MORPHINE SULFATE 2 MG/ML
4 INJECTION, SOLUTION INTRAMUSCULAR; INTRAVENOUS
Status: COMPLETED | OUTPATIENT
Start: 2023-03-28 | End: 2023-03-28

## 2023-03-28 RX ORDER — LABETALOL HCL 20 MG/4 ML
10 SYRINGE (ML) INTRAVENOUS
Status: COMPLETED | OUTPATIENT
Start: 2023-03-28 | End: 2023-03-28

## 2023-03-28 RX ORDER — ERGOCALCIFEROL 1.25 MG/1
50000 CAPSULE ORAL
Status: DISCONTINUED | OUTPATIENT
Start: 2023-03-29 | End: 2023-03-30 | Stop reason: HOSPADM

## 2023-03-28 RX ORDER — IBUPROFEN 200 MG
16 TABLET ORAL
Status: DISCONTINUED | OUTPATIENT
Start: 2023-03-28 | End: 2023-03-28

## 2023-03-28 RX ORDER — POTASSIUM CHLORIDE 20 MEQ/1
40 TABLET, EXTENDED RELEASE ORAL
Status: COMPLETED | OUTPATIENT
Start: 2023-03-28 | End: 2023-03-28

## 2023-03-28 RX ORDER — DEXTROSE 40 %
15 GEL (GRAM) ORAL
Status: DISCONTINUED | OUTPATIENT
Start: 2023-03-28 | End: 2023-03-30 | Stop reason: HOSPADM

## 2023-03-28 RX ORDER — LANOLIN ALCOHOL/MO/W.PET/CERES
1 CREAM (GRAM) TOPICAL EVERY OTHER DAY
Status: DISCONTINUED | OUTPATIENT
Start: 2023-03-29 | End: 2023-03-30 | Stop reason: HOSPADM

## 2023-03-28 RX ORDER — LABETALOL HCL 20 MG/4 ML
10 SYRINGE (ML) INTRAVENOUS EVERY 6 HOURS PRN
Status: DISCONTINUED | OUTPATIENT
Start: 2023-03-28 | End: 2023-03-30 | Stop reason: HOSPADM

## 2023-03-28 RX ORDER — SODIUM CHLORIDE 0.9 % (FLUSH) 0.9 %
10 SYRINGE (ML) INJECTION EVERY 12 HOURS PRN
Status: DISCONTINUED | OUTPATIENT
Start: 2023-03-28 | End: 2023-03-30 | Stop reason: HOSPADM

## 2023-03-28 RX ORDER — ACETAMINOPHEN 325 MG/1
650 TABLET ORAL EVERY 6 HOURS PRN
Status: DISCONTINUED | OUTPATIENT
Start: 2023-03-28 | End: 2023-03-30 | Stop reason: HOSPADM

## 2023-03-28 RX ORDER — MORPHINE SULFATE 2 MG/ML
2 INJECTION, SOLUTION INTRAMUSCULAR; INTRAVENOUS
Status: COMPLETED | OUTPATIENT
Start: 2023-03-28 | End: 2023-03-28

## 2023-03-28 RX ORDER — GLUCAGON 1 MG
1 KIT INJECTION
Status: DISCONTINUED | OUTPATIENT
Start: 2023-03-28 | End: 2023-03-29

## 2023-03-28 RX ADMIN — METHYLPREDNISOLONE SODIUM SUCCINATE 125 MG: 125 INJECTION, POWDER, FOR SOLUTION INTRAMUSCULAR; INTRAVENOUS at 07:03

## 2023-03-28 RX ADMIN — MORPHINE SULFATE 4 MG: 2 INJECTION, SOLUTION INTRAMUSCULAR; INTRAVENOUS at 04:03

## 2023-03-28 RX ADMIN — OXYCODONE HYDROCHLORIDE 5 MG: 5 TABLET ORAL at 07:03

## 2023-03-28 RX ADMIN — AMLODIPINE BESYLATE 5 MG: 5 TABLET ORAL at 07:03

## 2023-03-28 RX ADMIN — LABETALOL HYDROCHLORIDE 10 MG: 5 INJECTION, SOLUTION INTRAVENOUS at 05:03

## 2023-03-28 RX ADMIN — POTASSIUM CHLORIDE 40 MEQ: 1500 TABLET, EXTENDED RELEASE ORAL at 06:03

## 2023-03-28 RX ADMIN — MORPHINE SULFATE 2 MG: 2 INJECTION, SOLUTION INTRAMUSCULAR; INTRAVENOUS at 06:03

## 2023-03-28 RX ADMIN — LABETALOL HYDROCHLORIDE 10 MG: 5 INJECTION, SOLUTION INTRAVENOUS at 04:03

## 2023-03-28 RX ADMIN — ENOXAPARIN SODIUM 40 MG: 40 INJECTION SUBCUTANEOUS at 07:03

## 2023-03-28 NOTE — TELEPHONE ENCOUNTER
Patient states she has an appointment at 1:30 today and is unable to keep appointment but would like her Amlodipine  5 mg refilled.   Rescheduled patient for 04/10/2023 @ 12:45  Patient would like results of labs also.

## 2023-03-28 NOTE — ED PROVIDER NOTES
Encounter Date: 3/28/2023       History     Chief Complaint   Patient presents with    Joint Pain    Hypertension     PT IN /[facility] W CONTINUED JOINT PAIN, WORSE IN KNEES, NECK AND BACK. PT BP ELEVATED, REPORTS COMPLIANCE W MEDS.      48 yo F w/ PMHx significant for HTN presents to ED c/o ongoing generalized joint pain, erythema, warmth & swelling. Patient reports symptoms initially began around November of last year & have progressively gotten worse since that time. Reports pain is all over, but is worse in neck, back & knees. Seen in this ED 3/7 & had significant elevation of inflammatory markers & knee XRs which showed effusion. Patient was discharged w/ meds for symptom relief & referred to clinic for further evaluation. Patient seen in clinic 4 days ago & had extensive lab workup drawn which showed ESR >130, , anti-CCP pos & RF pos. Patient reports severe weakness in b/l LEs & states this has reached the point where she is unable to walk at all over the past 4 days. Reports that she has essentially been bedbound for past 4 days & is urinating in bed, but denies any incontinence. Reports that she has not had BM in approx 10 days, but believes this is due to little PO intake. Denies anorexia, abdominal pain, abdominal distension, blood in stool, N/V, LE numbness, LE paresthesia, LE paralysis, unexplained weight loss, night sweats, F/C, rash. Hypertensive & tachycardic on arrival. Vitals otherwise stable, patient in NAD.    Review of patient's allergies indicates:  No Known Allergies  Past Medical History:   Diagnosis Date    Arthritis      History reviewed. No pertinent surgical history.  Family History   Problem Relation Age of Onset    Hyperlipidemia Mother      Social History     Tobacco Use    Smoking status: Former     Packs/day: 0.50     Types: Cigarettes    Smokeless tobacco: Never   Substance Use Topics    Alcohol use: Not Currently    Drug use: Never     Review of Systems   HENT:  Negative for  congestion, rhinorrhea and sore throat.    Eyes:  Negative for visual disturbance.   Respiratory:  Negative for cough and shortness of breath.    Cardiovascular:  Negative for chest pain, palpitations and leg swelling.   Genitourinary:  Negative for dysuria, hematuria, vaginal bleeding and vaginal discharge.   Skin:  Negative for pallor.   Allergic/Immunologic: Negative for immunocompromised state.   Neurological:  Negative for dizziness, syncope and headaches.   Psychiatric/Behavioral:  Negative for confusion.    All other systems reviewed and are negative.    Physical Exam     Initial Vitals [03/28/23 1531]   BP Pulse Resp Temp SpO2   (!) 181/115 107 18 99.1 °F (37.3 °C) 97 %      MAP       --         Physical Exam    Nursing note and vitals reviewed.  Constitutional: She appears well-developed and well-nourished. She is not diaphoretic. No distress.   HENT:   Head: Normocephalic and atraumatic.   Mouth/Throat: Oropharynx is clear and moist. No oropharyngeal exudate.   Eyes: Conjunctivae and EOM are normal. Pupils are equal, round, and reactive to light. No scleral icterus.   Neck: Neck supple.   Normal range of motion.  Cardiovascular:  Normal rate, regular rhythm, normal heart sounds and intact distal pulses.     Exam reveals no gallop and no friction rub.       No murmur heard.  Pulmonary/Chest: Breath sounds normal. No respiratory distress. She has no wheezes. She has no rhonchi. She has no rales.   Abdominal: Abdomen is soft. Bowel sounds are normal. She exhibits no distension. There is no abdominal tenderness. There is no rebound and no guarding.   Genitourinary:    Rectum normal.   Rectum:      Guaiac result negative.      No abnormal anal tone.   Guaiac negative stool. : Acceptable.   Genitourinary Comments: Rectal exam chaperoned by Mitra Chapa RN     Musculoskeletal:         General: No edema. Normal range of motion.      Right wrist: Swelling, tenderness and bony tenderness present.  No crepitus. Normal range of motion. Normal pulse.      Left wrist: Swelling, tenderness and bony tenderness present. No crepitus. Normal range of motion. Normal pulse.      Right hand: Swelling, tenderness and bony tenderness present. Normal range of motion. Decreased strength. Normal sensation. Normal capillary refill.      Left hand: Swelling, tenderness and bony tenderness present. Normal range of motion. Decreased strength. Normal sensation. Normal capillary refill.      Cervical back: Normal range of motion and neck supple. Tenderness present. No spasms or bony tenderness. Normal range of motion.      Thoracic back: Normal.      Lumbar back: Tenderness present. No spasms or bony tenderness. Normal range of motion.      Right knee: Swelling, effusion and bony tenderness present. No deformity, erythema or ecchymosis. Normal range of motion. Tenderness present.      Left knee: Swelling, effusion and bony tenderness present. No deformity, erythema or ecchymosis. Normal range of motion. Tenderness present.      Right ankle: Swelling present. No deformity. Tenderness present. Normal range of motion.      Left ankle: Swelling present. No deformity. Tenderness present. Normal range of motion.     Neurological: She is alert and oriented to person, place, and time. She is not disoriented. She displays no tremor. No sensory deficit. She exhibits normal muscle tone. She displays no seizure activity. Coordination normal. GCS eye subscore is 4. GCS verbal subscore is 5. GCS motor subscore is 6.   Decreased strength in distal b/l LEs   Skin: Skin is warm and dry. No rash noted. There is erythema (b/l knees). No pallor.   Psychiatric: She has a normal mood and affect. Thought content normal.       ED Course   Procedures  Labs Reviewed   COMPREHENSIVE METABOLIC PANEL - Abnormal; Notable for the following components:       Result Value    Sodium Level 135 (*)     Potassium Level 3.3 (*)     Glucose Level 110 (*)     Albumin  Level 2.3 (*)     Globulin 5.7 (*)     Albumin/Globulin Ratio 0.4 (*)     Aspartate Aminotransferase 44 (*)     All other components within normal limits   SEDIMENTATION RATE, AUTOMATED - Abnormal; Notable for the following components:    Sed Rate 73 (*)     All other components within normal limits   C-REACTIVE PROTEIN - Abnormal; Notable for the following components:    C-Reactive Protein 202.20 (*)     All other components within normal limits   CBC WITH DIFFERENTIAL - Abnormal; Notable for the following components:    RBC 3.63 (*)     Hgb 7.7 (*)     Hct 25.9 (*)     MCV 71.3 (*)     MCH 21.2 (*)     MCHC 29.7 (*)     RDW 18.9 (*)     Platelet 748 (*)     All other components within normal limits   CBC W/ AUTO DIFFERENTIAL    Narrative:     The following orders were created for panel order CBC Auto Differential.  Procedure                               Abnormality         Status                     ---------                               -----------         ------                     CBC with Differential[[id number]]        Abnormal            Final result                 Please view results for these tests on the individual orders.   EXTRA TUBES    Narrative:     The following orders were created for panel order EXTRA TUBES.  Procedure                               Abnormality         Status                     ---------                               -----------         ------                     Light Blue Top Hold[[id number]]                              In process                 Gold Top Hold[[id number]]                                    In process                   Please view results for these tests on the individual orders.   LIGHT BLUE TOP HOLD   GOLD TOP HOLD   URINALYSIS, REFLEX TO URINE CULTURE   POCT URINE PREGNANCY          Imaging Results              X-Ray Abdomen AP 1 View (Final result)  Result time 03/28/23 18:00:02      Final result by Chey Nash MD (03/28/23 18:00:02)                    Impression:      No acute abdominal radiographic abnormality.      Electronically signed by: Chey Nash  Date:    03/28/2023  Time:    18:00               Narrative:    EXAMINATION:  XR ABDOMEN AP 1 VIEW    CLINICAL HISTORY:  constipation;    TECHNIQUE:  AP View(s) of the abdomen was performed.    COMPARISON:  None.    FINDINGS:  No dilated bowel loops. No evidence of obstruction.Mild stool burden.    No appreciable acute osseous abnormality.                                       Medications   potassium chloride SA CR tablet 40 mEq (has no administration in time range)   labetalol 20 mg/4 mL (5 mg/mL) IV syring (10 mg Intravenous Given 3/28/23 1619)   morphine injection 4 mg (4 mg Intravenous Given 3/28/23 1619)   labetalol 20 mg/4 mL (5 mg/mL) IV syring (10 mg Intravenous Given 3/28/23 1759)   morphine injection 2 mg (2 mg Intravenous Given 3/28/23 1810)     Medical Decision Making:   Clinical Tests:   Lab Tests: Ordered and Reviewed  Radiological Study: Ordered and Reviewed  Other:   I have discussed this case with another health care provider.       <> Summary of the Discussion: Case discussed w/ Dr. Huynh & he performed face-to-face evaluation at bedside & reviewed all diagnostic information. All of his recommendations followed.                        Clinical Impression:   Final diagnoses:  [M06.9] Rheumatoid arthritis flare (Primary)  [D75.839] Thrombocytosis  [R29.898] Weakness of both lower extremities  [R79.82] Elevated C-reactive protein (CRP)  [R70.0] Elevated erythrocyte sedimentation rate  [I10] Uncontrolled hypertension  [D50.9] Iron deficiency anemia, unspecified iron deficiency anemia type        ED Disposition Condition    Admit Stable                CRISTY Bolanos  03/28/23 4823

## 2023-03-29 LAB
ALBUMIN SERPL-MCNC: 2.2 G/DL (ref 3.5–5)
ALBUMIN/GLOB SERPL: 0.4 RATIO (ref 1.1–2)
ALP SERPL-CCNC: 103 UNIT/L (ref 40–150)
ALT SERPL-CCNC: 17 UNIT/L (ref 0–55)
AST SERPL-CCNC: 26 UNIT/L (ref 5–34)
BACTERIA FLD CULT: NORMAL
BASOPHILS # BLD AUTO: 0 X10(3)/MCL (ref 0–0.2)
BASOPHILS NFR BLD AUTO: 0 %
BILIRUBIN DIRECT+TOT PNL SERPL-MCNC: 0.3 MG/DL
BUN SERPL-MCNC: 11.7 MG/DL (ref 7–18.7)
CALCIUM SERPL-MCNC: 10.1 MG/DL (ref 8.4–10.2)
CHLORIDE SERPL-SCNC: 101 MMOL/L (ref 98–107)
CK SERPL-CCNC: 19 U/L (ref 29–168)
CO2 SERPL-SCNC: 26 MMOL/L (ref 22–29)
CREAT SERPL-MCNC: 0.76 MG/DL (ref 0.55–1.02)
EOSINOPHIL # BLD AUTO: 0 X10(3)/MCL (ref 0–0.9)
EOSINOPHIL NFR BLD AUTO: 0 %
ERYTHROCYTE [DISTWIDTH] IN BLOOD BY AUTOMATED COUNT: 19.1 % (ref 11.5–17)
GFR SERPLBLD CREATININE-BSD FMLA CKD-EPI: >60 MLS/MIN/1.73/M2
GLOBULIN SER-MCNC: 6.2 GM/DL (ref 2.4–3.5)
GLUCOSE SERPL-MCNC: 210 MG/DL (ref 74–100)
HCT VFR BLD AUTO: 28 % (ref 37–47)
HGB BLD-MCNC: 8.1 G/DL (ref 12–16)
IMM GRANULOCYTES # BLD AUTO: 0.02 X10(3)/MCL (ref 0–0.04)
IMM GRANULOCYTES NFR BLD AUTO: 0.4 %
LYMPHOCYTES # BLD AUTO: 0.53 X10(3)/MCL (ref 0.6–4.6)
LYMPHOCYTES NFR BLD AUTO: 10.3 %
MAGNESIUM SERPL-MCNC: 2 MG/DL (ref 1.6–2.6)
MCH RBC QN AUTO: 21 PG (ref 27–31)
MCHC RBC AUTO-ENTMCNC: 28.9 G/DL (ref 33–36)
MCV RBC AUTO: 72.7 FL (ref 80–94)
MONOCYTES # BLD AUTO: 0.04 X10(3)/MCL (ref 0.1–1.3)
MONOCYTES NFR BLD AUTO: 0.8 %
NEUTROPHILS # BLD AUTO: 4.54 X10(3)/MCL (ref 2.1–9.2)
NEUTROPHILS NFR BLD AUTO: 88.5 %
NRBC BLD AUTO-RTO: 0 %
PHOSPHATE SERPL-MCNC: 4.3 MG/DL (ref 2.3–4.7)
PLATELET # BLD AUTO: 821 X10(3)/MCL (ref 130–400)
PMV BLD AUTO: 8.9 FL (ref 7.4–10.4)
POCT GLUCOSE: 162 MG/DL (ref 70–110)
POCT GLUCOSE: 168 MG/DL (ref 70–110)
POCT GLUCOSE: 207 MG/DL (ref 70–110)
POTASSIUM SERPL-SCNC: 4.4 MMOL/L (ref 3.5–5.1)
PROT SERPL-MCNC: 8.4 GM/DL (ref 6.4–8.3)
RBC # BLD AUTO: 3.85 X10(6)/MCL (ref 4.2–5.4)
SODIUM SERPL-SCNC: 138 MMOL/L (ref 136–145)
WBC # SPEC AUTO: 5.1 X10(3)/MCL (ref 4.5–11.5)

## 2023-03-29 PROCEDURE — 82550 ASSAY OF CK (CPK): CPT | Performed by: STUDENT IN AN ORGANIZED HEALTH CARE EDUCATION/TRAINING PROGRAM

## 2023-03-29 PROCEDURE — 97116 GAIT TRAINING THERAPY: CPT

## 2023-03-29 PROCEDURE — 97162 PT EVAL MOD COMPLEX 30 MIN: CPT

## 2023-03-29 PROCEDURE — 63600175 PHARM REV CODE 636 W HCPCS

## 2023-03-29 PROCEDURE — 63600175 PHARM REV CODE 636 W HCPCS: Performed by: STUDENT IN AN ORGANIZED HEALTH CARE EDUCATION/TRAINING PROGRAM

## 2023-03-29 PROCEDURE — 83735 ASSAY OF MAGNESIUM: CPT | Performed by: STUDENT IN AN ORGANIZED HEALTH CARE EDUCATION/TRAINING PROGRAM

## 2023-03-29 PROCEDURE — 11000001 HC ACUTE MED/SURG PRIVATE ROOM

## 2023-03-29 PROCEDURE — 97166 OT EVAL MOD COMPLEX 45 MIN: CPT

## 2023-03-29 PROCEDURE — 82085 ASSAY OF ALDOLASE: CPT | Mod: 90 | Performed by: STUDENT IN AN ORGANIZED HEALTH CARE EDUCATION/TRAINING PROGRAM

## 2023-03-29 PROCEDURE — 99223 PR INITIAL HOSPITAL CARE,LEVL III: ICD-10-PCS | Mod: ,,, | Performed by: INTERNAL MEDICINE

## 2023-03-29 PROCEDURE — 97535 SELF CARE MNGMENT TRAINING: CPT

## 2023-03-29 PROCEDURE — 80053 COMPREHEN METABOLIC PANEL: CPT | Performed by: STUDENT IN AN ORGANIZED HEALTH CARE EDUCATION/TRAINING PROGRAM

## 2023-03-29 PROCEDURE — 84100 ASSAY OF PHOSPHORUS: CPT | Performed by: STUDENT IN AN ORGANIZED HEALTH CARE EDUCATION/TRAINING PROGRAM

## 2023-03-29 PROCEDURE — 85025 COMPLETE CBC W/AUTO DIFF WBC: CPT | Performed by: STUDENT IN AN ORGANIZED HEALTH CARE EDUCATION/TRAINING PROGRAM

## 2023-03-29 PROCEDURE — 99223 1ST HOSP IP/OBS HIGH 75: CPT | Mod: ,,, | Performed by: INTERNAL MEDICINE

## 2023-03-29 PROCEDURE — 25000003 PHARM REV CODE 250: Performed by: STUDENT IN AN ORGANIZED HEALTH CARE EDUCATION/TRAINING PROGRAM

## 2023-03-29 PROCEDURE — 86480 TB TEST CELL IMMUN MEASURE: CPT | Mod: 90 | Performed by: STUDENT IN AN ORGANIZED HEALTH CARE EDUCATION/TRAINING PROGRAM

## 2023-03-29 RX ORDER — METHOTREXATE 2.5 MG/1
15 TABLET ORAL
Status: DISCONTINUED | OUTPATIENT
Start: 2023-03-30 | End: 2023-03-30 | Stop reason: HOSPADM

## 2023-03-29 RX ORDER — KETOROLAC TROMETHAMINE 30 MG/ML
15 INJECTION, SOLUTION INTRAMUSCULAR; INTRAVENOUS ONCE
Status: COMPLETED | OUTPATIENT
Start: 2023-03-29 | End: 2023-03-29

## 2023-03-29 RX ORDER — FOLIC ACID 1 MG/1
1 TABLET ORAL DAILY
Status: DISCONTINUED | OUTPATIENT
Start: 2023-03-30 | End: 2023-03-30 | Stop reason: HOSPADM

## 2023-03-29 RX ORDER — IBUPROFEN 200 MG
16 TABLET ORAL
Status: DISCONTINUED | OUTPATIENT
Start: 2023-03-29 | End: 2023-03-30 | Stop reason: HOSPADM

## 2023-03-29 RX ORDER — KETOROLAC TROMETHAMINE 30 MG/ML
30 INJECTION, SOLUTION INTRAMUSCULAR; INTRAVENOUS EVERY 6 HOURS PRN
Status: DISCONTINUED | OUTPATIENT
Start: 2023-03-29 | End: 2023-03-30 | Stop reason: HOSPADM

## 2023-03-29 RX ORDER — PREDNISONE 20 MG/1
20 TABLET ORAL DAILY
Status: DISCONTINUED | OUTPATIENT
Start: 2023-03-29 | End: 2023-03-29

## 2023-03-29 RX ORDER — INSULIN ASPART 100 [IU]/ML
0-5 INJECTION, SOLUTION INTRAVENOUS; SUBCUTANEOUS
Status: DISCONTINUED | OUTPATIENT
Start: 2023-03-29 | End: 2023-03-30 | Stop reason: HOSPADM

## 2023-03-29 RX ORDER — AMLODIPINE BESYLATE 5 MG/1
5 TABLET ORAL DAILY
Qty: 90 TABLET | Refills: 1 | OUTPATIENT
Start: 2023-03-29 | End: 2024-03-28

## 2023-03-29 RX ORDER — IBUPROFEN 200 MG
24 TABLET ORAL
Status: DISCONTINUED | OUTPATIENT
Start: 2023-03-29 | End: 2023-03-30 | Stop reason: HOSPADM

## 2023-03-29 RX ORDER — PANTOPRAZOLE SODIUM 40 MG/1
40 TABLET, DELAYED RELEASE ORAL DAILY
Status: DISCONTINUED | OUTPATIENT
Start: 2023-03-30 | End: 2023-03-30 | Stop reason: HOSPADM

## 2023-03-29 RX ORDER — PREDNISONE 20 MG/1
20 TABLET ORAL DAILY
Status: DISCONTINUED | OUTPATIENT
Start: 2023-03-30 | End: 2023-03-30 | Stop reason: HOSPADM

## 2023-03-29 RX ORDER — GLUCAGON 1 MG
1 KIT INJECTION
Status: DISCONTINUED | OUTPATIENT
Start: 2023-03-29 | End: 2023-03-30 | Stop reason: HOSPADM

## 2023-03-29 RX ADMIN — KETOROLAC TROMETHAMINE 15 MG: 30 INJECTION, SOLUTION INTRAMUSCULAR at 09:03

## 2023-03-29 RX ADMIN — INSULIN ASPART 0 UNITS: 100 INJECTION, SOLUTION INTRAVENOUS; SUBCUTANEOUS at 05:03

## 2023-03-29 RX ADMIN — AMLODIPINE BESYLATE 5 MG: 5 TABLET ORAL at 09:03

## 2023-03-29 RX ADMIN — FERROUS SULFATE TAB EC 325 MG (65 MG FE EQUIVALENT) 1 EACH: 325 (65 FE) TABLET DELAYED RESPONSE at 09:03

## 2023-03-29 RX ADMIN — ENOXAPARIN SODIUM 40 MG: 40 INJECTION SUBCUTANEOUS at 05:03

## 2023-03-29 RX ADMIN — METHYLPREDNISOLONE SODIUM SUCCINATE 60 MG: 125 INJECTION, POWDER, FOR SOLUTION INTRAMUSCULAR; INTRAVENOUS at 09:03

## 2023-03-29 RX ADMIN — ERGOCALCIFEROL 50000 UNITS: 1.25 CAPSULE, LIQUID FILLED ORAL at 09:03

## 2023-03-29 NOTE — PLAN OF CARE
Problem: Adult Inpatient Plan of Care  Goal: Plan of Care Review  Outcome: Ongoing, Progressing  Flowsheets (Taken 3/29/2023 0803)  Plan of Care Reviewed With: patient  Goal: Absence of Hospital-Acquired Illness or Injury  Outcome: Ongoing, Progressing  Intervention: Identify and Manage Fall Risk  Flowsheets (Taken 3/29/2023 0803)  Safety Promotion/Fall Prevention:   assistive device/personal item within reach   side rails raised x 2   instructed to call staff for mobility  Intervention: Prevent Skin Injury  Flowsheets (Taken 3/29/2023 0803)  Body Position: position changed independently  Intervention: Prevent and Manage VTE (Venous Thromboembolism) Risk  Flowsheets (Taken 3/29/2023 0803)  VTE Prevention/Management: dorsiflexion/plantar flexion performed  Intervention: Prevent Infection  Flowsheets (Taken 3/29/2023 0803)  Infection Prevention:   single patient room provided   hand hygiene promoted   rest/sleep promoted  Goal: Optimal Comfort and Wellbeing  Outcome: Ongoing, Progressing  Intervention: Monitor Pain and Promote Comfort  Flowsheets (Taken 3/29/2023 0803)  Pain Management Interventions:   around-the-clock dosing utilized   quiet environment facilitated   relaxation techniques promoted  Intervention: Provide Person-Centered Care  Flowsheets (Taken 3/29/2023 0803)  Trust Relationship/Rapport:   care explained   questions answered   questions encouraged   emotional support provided   reassurance provided   empathic listening provided   thoughts/feelings acknowledged   choices provided     Problem: Pain Acute  Goal: Acceptable Pain Control and Functional Ability  Outcome: Ongoing, Progressing  Intervention: Develop Pain Management Plan  Flowsheets (Taken 3/29/2023 0803)  Pain Management Interventions:   around-the-clock dosing utilized   quiet environment facilitated   relaxation techniques promoted  Intervention: Prevent or Manage Pain  Flowsheets (Taken 3/29/2023 0803)  Sleep/Rest Enhancement:    relaxation techniques promoted   regular sleep/rest pattern promoted     Problem: Fall Injury Risk  Goal: Absence of Fall and Fall-Related Injury  Outcome: Ongoing, Progressing  Intervention: Promote Injury-Free Environment  Flowsheets (Taken 3/29/2023 0803)  Safety Promotion/Fall Prevention:   assistive device/personal item within reach   side rails raised x 2   instructed to call staff for mobility

## 2023-03-29 NOTE — PROGRESS NOTES
"[facility] Medicine Wards Progress Note     Resident Team: [facility] Medicine List 1  Attending Physician: Yadira Myers MD    Subjective:      Brief HPI:  Nela Shaver is a 46 yo F with PMH of Raynaud's syndrome and HTN who presented to [facility] ED on 3/28/23 for progressively worsening symmetric bilateral lower extremity weakness associated with bilateral knee pain and swelling. Weakness began around 2022. Around that time she also noticed swelling of the hands over the metacarpals with no specific joint involvement as well as swelling of the knee joints. She reports decreased appetite over the course of her ailments and a 36 lb weight loss since Nicole. Beginning in 2023 her LE weakness began progressing more rapidly accompanied by the swelling in her knees and hands becoming extremely painful. She has been unable to open a bottle of water or ambulate over the past 4 days. She has been defecating and urinating on herself in bed at home due to inability to raise from bed. Of note, she lives alone in a 2-story home. Reports symptoms are worse in the morning and improve throughout the day. Denies lightheadedness, dizziness, SOB, severe neck pain, calf pain, hemoptysis, rash, chest pain, constipation, diarrhea, fever/chills, n/v.     Interval History:   Significant improvements in mobility this morning. Patient able to lift leg from bed without difficulty. Will work with PT/OT today. Hands and knees remain painful and swollen. Tolerated PO intake last night. Denies SOB, chest pain, dizziness, n/v, fever/chills.     Objective:     Last 24 Hour Vital Signs:  BP  Min: 146/84  Max: 196/114  Temp  Av.6 °F (37 °C)  Min: 97.7 °F (36.5 °C)  Max: 99.1 °F (37.3 °C)  Pulse  Av.5  Min: 65  Max: 107  Resp  Av.4  Min: 17  Max: 21  SpO2  Av %  Min: 90 %  Max: 100 %  Height  Av' 4" (162.6 cm)  Min: 5' 4" (162.6 cm)  Max: 5' 4" (162.6 cm)  Weight  Av.8 kg (127 lb 6.8 oz)  Min: 57.8 kg (127 lb 6.8 oz)  " Max: 57.8 kg (127 lb 6.8 oz)  I/O last 3 completed shifts:  In: 300 [P.O.:300]  Out: 200 [Urine:200]    Physical Examination:  General: Awake, alert, & oriented to person, place & time. No acute distress.  Psychiatric: Mood and affect normal  HEENT: Redness to lateral aspect of left eye. Moist mucous membranes.  Cardiovascular: RRR. No murmurs, rubs, or gallups. No peripheral edema.  Pulmonary: CTAB. No wheezing, crackles, rales, or rhonchi. Breathing comfortably on room air.   Abdominal: Soft, non tender, non distended. Normoactive bowel sounds.   Extremities: Swelling and pain on palpation of bilateral hands. No swelling or deformation of DIP, PIP, or MCP. Edema to bilateral knees, no increased warmth or erythema.   Skin: Exposed skin is warm & dry.  Neuro: Exam limited due to pain. 3/5 strength to bilateral LE below knee, 4/5 to bilateral LE above knee.  strength 3/5 bilaterally.       Laboratory:  Most Recent Data:  CBC:   Lab Results   Component Value Date    WBC 5.1 03/29/2023    HGB 8.1 (L) 03/29/2023    HCT 28.0 (L) 03/29/2023     (H) 03/29/2023    MCV 72.7 (L) 03/29/2023    RDW 19.1 (H) 03/29/2023     BMP:   Lab Results   Component Value Date     03/29/2023    K 4.4 03/29/2023    CO2 26 03/29/2023    BUN 11.7 03/29/2023    CREATININE 0.76 03/29/2023    CALCIUM 10.1 03/29/2023    MG 2.00 03/29/2023    PHOS 4.3 03/29/2023     LFTs:   Lab Results   Component Value Date    ALBUMIN 2.2 (L) 03/29/2023    BILITOT 0.3 03/29/2023    AST 26 03/29/2023    ALKPHOS 103 03/29/2023    ALT 17 03/29/2023     FLP:   Lab Results   Component Value Date    CHOL 227 (H) 03/24/2023    HDL 29 (L) 03/24/2023    TRIG 165 (H) 03/24/2023     Anemia:   Lab Results   Component Value Date    IRON 10 (L) 03/24/2023    TIBC 181 (L) 03/24/2023    FERRITIN 222.95 (H) 03/24/2023     Other Results:  Radiology: No new imaging, previous imaging reviewed.    Current Medications:     Scheduled:   amLODIPine  5 mg Oral Daily     enoxaparin  40 mg Subcutaneous Daily    ergocalciferol  50,000 Units Oral Q7 Days    ferrous sulfate  1 tablet Oral Every other day    methylPREDNISolone sodium succinate injection  60 mg Intravenous BID        PRN:  acetaminophen, dextrose 10%, dextrose 10%, dextrose, dextrose, glucagon (human recombinant), hydrALAZINE, labetalol, naloxone, oxyCODONE, sodium chloride 0.9%    Assessment & Plan:     Rheumatologic arthritic flare  - RF > 100, CCP positive, c-ANCA/p-ANCA negative. MAGED and centromere protein antibody pending  - ESR 73,   - Given Solu-Medrol 125 with significant improvement. Will switch to prednisone 20 mg.   - Anticipate initiation of methotrexate  - Rheumatology consult, will follow-up recs and adjust as needed.    Elevated blood glucose  - A1c 5.2  - Likely 2/2 steroid administration  - Low dose SSI, with accuchecks     Iron deficiency anemia  - H/H 7.7/25.9 on admission  - MCV 72.7, iron 10  - Will give ferrous sulfate every other day     Vitamin D deficiency  - Vit D: 7  - Replete with ergocalciferol 50,000 U weekly     HTN  - Given labetalol IV x2 in ED  - Continue home amlodipine 5 mg qd     CODE STATUS: Full Code  Access: PIV  Antimicrobials: none  Diet: Diet heart healthy   DVT Prophylaxis: Lovenox ppx  GI Prophylaxis: none  Fluids: none     Disposition: Admitted with rheumatologic flare initiated on IV steroids. Rheumatology consult placed.      Elgin Trujillo MD  [facility] - PGY-1

## 2023-03-29 NOTE — PT/OT/SLP EVAL
"Physical Therapy Evaluation    Patient Name:  Nela Shaver   MRN:  [id number]    Recommendations:     Discharge Recommendations: nursing facility, skilled, rehabilitation facility (Pending pt's progress)   Discharge Equipment Recommendations: walker, rolling, bedside commode   Barriers to discharge: Inaccessible home, Decreased caregiver support, and fall risk, increased weakness, decreased endurance, decreased balance, increased need for assistance functional activities    Assessment:     Nela Shaver is a 47 y.o. female admitted with a medical diagnosis of:    Patient Active Problem List   Diagnosis    Polyarthralgia    Elevated C-reactive protein (CRP)    Elevated sed rate    Adult BMI <19 kg/sq m    Rapidly progressive weakness    Bilateral knee effusions    Primary hypertension     She presents with the following impairments/functional limitations: weakness, impaired endurance, impaired self care skills, impaired functional mobility, gait instability, impaired balance, decreased coordination, decreased lower extremity function, decreased upper extremity function, pain. Pt's friend in room during session. Pt tolerated session fair with increased fatigue throughout session. Pt required increased time to perform all activities during session. PT performed co-eval with OT.     Rehab Prognosis: Fair; patient would benefit from acute skilled PT services to address these deficits and reach maximum level of function.    Recent Surgery: * No surgery found *      Plan:     During this hospitalization, patient to be seen  (3-5 times per week) to address the identified rehab impairments via gait training, therapeutic activities, therapeutic exercises, neuromuscular re-education and progress toward the following goals:    Plan of Care Expires:  04/26/23    Subjective     Chief Complaint: "I still feel like I have weakness in  my legs and pain in my R hand."  Patient/Family Comments/goals: "to get stronger so that I can go " "back to work."  Pain/Comfort:  Pain Rating 1: 6/10  Location - Side 1: Left  Location 1: hand  Pain Addressed 1: Reposition, Distraction, Nurse notified  Pain Rating Post-Intervention 1: 6/10    Patients cultural, spiritual, Gnosticism conflicts given the current situation: no    Living Environment:  Pt states that she lives alone on second floor of apartment with 16 steps to enter with B hand railing and an additional 16 steps in home with L hand railing to get to bed/bathroom (upstairs), Living and kitchen (downstairs). She notes being independent with progressive weakness and increased difficulty performing daily activities over past 3 months. Pt's friend notes that he is the father of their child who lives in [place] and notes that they are trying to figure out where she will stay after D/C from hospital.   Prior to admission, patients level of function was Independent.  Equipment used at home: none.  DME owned (not currently used): none.  Upon discharge, patient will have assistance from unknown at this time.    Objective:     Communicated with RN and OT prior to session.  Patient found HOB elevated with peripheral IV, PureWick  upon PT entry to room. Pt's friend in room during session.     General Precautions: Standard, fall  Orthopedic Precautions:N/A   Braces: N/A  Respiratory Status: Room air    Exams:  Cognitive Exam:  Patient is oriented to Person, Place, Time, and Situation  Gross Motor Coordination:  WFL however diminished in B LE's grossly with decreased speed of movement  Sensation:    -       Intact  light/touch B LE's grossly  RLE ROM: WFL  RLE Strength: Deficits: 4-/5 Mmt strength grossly, 3+/5 MMT strength Hip flexion  LLE ROM: WFL  LLE Strength: Deficits: 4-/5 Mmt strength grossly, 3+/5 Mmt strength hip flexion    Functional Mobility:  Bed Mobility:     Scooting: contact guard assistance  Supine to Sit: contact guard assistance (increased time to perform, elevated HOB)  Sit to Supine: " contact guard assistance (increased time to perform)  Transfers:     Sit to Stand:  moderate assistance with rolling walker (x2 trials from EOB, increased verbal cueing for hand placement and sequencing, increased time to perform)  Bed<>bedside commode: moderate assistance with  rolling walker  using  Step Transfer (increased time to perform, increased verbal cueing for sequencing, hand placement, foot placement and increased B hip flexion, displayed increased anterior trunk lean)  Gait: 5 feet with turn, then an additional 5 feet with RW at Mod A (increased time to perform, increased anterior trunk lean, decreased R and L hip flexion, decreased step through gait pattern, decreased stride length, increased verbal cueing to increase gait and for foot placement during turn, required increased seated rest break after, noted increased pain in B ankles)  Balance: Sitting balance static: Good  Static and dynamic standing balance: Poor+      Treatment & Education:  Pt educated about increased movement of B knees and ankles in bed and during sitting EOB to increase strength, functional independence and gait distances.     Patient left HOB elevated with all lines intact, call button in reach, RN notified, and Pt's friend present.    Vitals   Vitals at Rest  BP  155/92   HR  73   O2 Sat  100%   Pain          GOALS:   Multidisciplinary Problems       Physical Therapy Goals          Problem: Physical Therapy    Goal Priority Disciplines Outcome Goal Variances Interventions   Physical Therapy Goal     PT, PT/OT Ongoing, Progressing     Description: Goals to be met by: Discharge     Patient will increase functional independence with mobility by performing:    -. Supine to sit with Stand-by Assistance  -. Sit to supine with Stand-by Assistance  -. Sit to stand transfer with Contact Guard Assistance  -. Bed to chair transfer with Contact Guard Assistance using Rolling Walker  -. Gait  x 90 feet with Contact Guard Assistance using  Rolling Walker.                          History:     Past Medical History:   Diagnosis Date    Arthritis        History reviewed. No pertinent surgical history.    Time Tracking:     PT Received On: 03/29/23  PT Start Time: 1036     PT Stop Time: 1123  PT Total Time (min): 47 min     Billable Minutes: Evaluation 24 and Gait Training 13      03/29/2023

## 2023-03-29 NOTE — PT/OT/SLP EVAL
Occupational Therapy   Evaluation    Name: Nela Shaver  MRN: [id number]  Admitting Diagnosis:   Patient Active Problem List   Diagnosis    Polyarthralgia    Elevated C-reactive protein (CRP)    Elevated sed rate    Adult BMI <19 kg/sq m    Rapidly progressive weakness    Bilateral knee effusions    Primary hypertension    Nela Shaver is a 46 yo F with PMH of Raynaud's syndrome and HTN who presented to [facility] ED on 3/28/23 for progressively worsening symmetric bilateral lower extremity weakness associated with bilateral knee pain and swelling. Weakness began around November 2022. Around that time she also noticed swelling of the hands over the metacarpals with no specific joint involvement as well as swelling of the knee joints. She reports decreased appetite over the course of her ailments and a 36 lb weight loss since Christmas. Beginning in March 2023 her LE weakness began progressing more rapidly accompanied by the swelling in her knees and hands becoming extremely painful. She has been unable to open a bottle of water or ambulate over the past 4 days. She has been defecating and urinating on herself in bed at home due to inability to raise from bed. Of note, she lives alone in a 2-story home. Reports symptoms are worse in the morning and improve throughout the day. Denies lightheadedness, dizziness, SOB, severe neck pain, calf pain, hemoptysis, rash, chest pain, constipation, diarrhea, fever/chills, n/v.   Recent Surgery: * No surgery found *      Recommendations:     Discharge Recommendations: nursing facility, skilled, rehabilitation facility  Discharge Equipment Recommendations and also needed to be obtained :  walker, rolling, bedside commode, bath bench;   Barriers to discharge:  Inaccessible home environment, Decreased caregiver support, Other (Comment) (severity of deficits)    Assessment:     Nela Shaver is a 47 y.o. female with a medical diagnosis of see above.  She presents with progressive weakness  "with functional decline. Performance deficits affecting function: weakness, impaired endurance, impaired self care skills, impaired functional mobility, gait instability, impaired balance, decreased upper extremity function, decreased lower extremity function, pain, impaired coordination, edema.      Rehab Prognosis: Good; patient would benefit from acute skilled OT services to address these deficits and reach maximum level of function.       Plan:     Patient to be seen  (Mon-Fri 2-5 times per week) to address the above listed problems via therapeutic activities, therapeutic exercises, neuromuscular re-education  Plan of Care Expires:  (d/c)  Plan of Care Reviewed with: patient, friend    Subjective     Chief Complaint: weakness and pain L UE and hand  Patient/Family Comments/goals: get stronger and back to PLOF    Occupational Profile:  Living Environment: Pt lives alone in 2nd floor and 2 story apt with 16 steps and B HR to enter apt. And 16 steps and L HR to 2nd story where only bed and bath are located. 1st floor is kitchen and living room only. Pt has tub shower combo   Previous level of function: independent basic and I ADL's without AD  Roles and Routines: Pt drives, works FT as , shops, cooks and performs household chores. Note progressive decline since ~ nov 2022  Equipment Used at Home: none  Assistance upon Discharge: pt has sons father willing to assist however lives in [place] and pt expressed " not moving there" and will discuss more    Pain/Comfort:  Pain Rating 1: 6/10  Location - Side 1: Left  Location 1: hand  Pain Addressed 1: Reposition, Distraction, Nurse notified  Pain Rating Post-Intervention 1: 6/10    Patients cultural, spiritual, Mandaeism conflicts given the current situation: no    Objective:     Communicated with: Nurse Gan prior to session.  Patient found HOB elevated with peripheral IV, PureWick upon OT entry to room.    General Precautions: Standard, fall  Orthopedic " Precautions: N/A  Braces: N/A  Respiratory Status: Room air    Occupational Performance:    Bed Mobility:    Patient completed Scooting with minimum assistance forward EOB   Patient completed Supine to Sit with contact guard assistance  Patient completed Sit to Supine with contact guard assistance    Functional Mobility/Transfers:  Patient completed Sit <> Stand Transfer with moderate assistance  with  rolling walker   Patient completed Toilet Transfer to [facility]  with Stand Pivot technique with moderate assistance with  rolling walker  Functional Mobility: mod A with RW ambulate 10 feet with flexed posture , difficulty with WS during gait and small steps; pt guarded and c/o of B LE weakness and pain after ~ 5 feet  and became tearful     Activities of Daily Living:  Feeding:  s/u assist    Grooming: stand by assistance seated EOB   Lower Body Dressing: stand by assistance don socks while EOB; max A diaper and pt requiring B UE support on RW in standing and unable to clothing over hips   Toileting: max/total A hygiene and clothing mgt as pt only assisting with standing only during toileting routine and requires caregiver assist for hygiene and clothing mgt;  and pt with purewick at this time     Cognitive/Visual Perceptual:  Cognitive/Psychosocial Skills:     -       Oriented to: Person, Place, Time, and Situation   -       Follows Commands/attention:Follows multistep  commands  -       Safety awareness/insight to disability: intact   -       Mood/Affect/Coping skills/emotional control: Appropriate to situation, Anxious, and guarded motor movements    Physical Exam:  Balance: -       sitting EOB static mod I and dynamic SBA ; standing static with B UE support and min to mod A     Note L UE/hand guarded throughout session with VC to incorporate into activity   Edema:  L UE and hand   Sensation:    -       Intact  light/touch B UE and hand  Dominant hand: -       right   Upper Extremity Range of Motion:  -       Right  Upper Extremity: WFL  -       Left Upper Extremity: min decreased L shoulder , otherwise WFL  Upper Extremity Strength: -       Right Upper Extremity: 3/5 shoulder, 3+/5 elbow , forearm , wrist and hand  -       Left Upper Extremity: 3-/5 shoulder, 3/5 elbow, 3-/5 forearm and wirist and 3/5 hand   Strength: -       Right Upper Extremity: 3+/5  -       Left Upper Extremity: 3/5  Fine Motor Coordination:    -       Intact  Left hand thumb/finger opposition skills and Right hand thumb/finger opposition skills; limited pincers strength noted during self care tasks       Treatment & Education:  Pt educated on POC and assist x 2 up to BSC and for toileting routine. Pt with good understanding. Pt issued resistive ball and instructed in ex to improve hand and pincer strength .    Pt encouraged to sit EOB for meals as tolerated.      Transfer training to [facility] with focus on  hand placement ,  WS, postural control   and balance .     Patient left HOB elevated with all lines intact, call button in reach, and nurse  notified    GOALS:   Multidisciplinary Problems       Occupational Therapy Goals          Problem: Occupational Therapy    Goal Priority Disciplines Outcome Interventions   Occupational Therapy Goal     OT, PT/OT Ongoing, Progressing    Description: Goals to be met by: d/c     Patient will increase functional independence with ADLs by performing:    UE Dressing with Stand-by Assistanc from EOB.  LE Dressing with Minimal Assistance from EOB .  Grooming while standing at sink with Contact Guard Assistance.  Toileting from bedside commode with Minimal Assistance for hygiene and clothing management.   Toilet transfer to bedside commode with Contact Guard Assistance with RW.                         History:     Past Medical History:   Diagnosis Date    Arthritis        History reviewed. No pertinent surgical history.    Time Tracking:     OT Date of Treatment: 03/29/23  OT Start Time: 1035  OT Stop Time: 1124  OT Total  Time (min): 49 min    Billable Minutes:Evaluation 25 min  Self Care/Home Management 10 min   Total Time 49 min as PT assist x 14 min     3/29/2023

## 2023-03-29 NOTE — PLAN OF CARE
Problem: Occupational Therapy  Goal: Occupational Therapy Goal  Description: Goals to be met by: d/c     Patient will increase functional independence with ADLs by performing:    UE Dressing with Stand-by Assistanc from EOB.  LE Dressing with Minimal Assistance from EOB .  Grooming while standing at sink with Contact Guard Assistance.  Toileting from bedside commode with Minimal Assistance for hygiene and clothing management.   Toilet transfer to bedside commode with Contact Guard Assistance with RW.    Outcome: Ongoing, Progressing

## 2023-03-29 NOTE — PROGRESS NOTES
Inpatient Nutrition Assessment    Admit Date: 3/28/2023   Total duration of encounter: 1 day     Nutrition Recommendation/Prescription     Continue Heart Healthy diet as tolerated  Will send Boost Plus at breakfast per pt preference (provides 360 kcal, 14 g protein per serving)  Consider daily MVI  Medical management of hyperglycemia  Monitor po intake, wt, labs    Communication of Recommendations: reviewed with patient and reviewed with family    Nutrition Assessment     Malnutrition Assessment/Nutrition-Focused Physical Exam    Malnutrition in the context of chronic illness  Degree of Malnutrition: non-severe (moderate) malnutrition  Energy Intake: < 75% of estimated energy requirement for >/= 1 month  Interpretation of Weight Loss: >7.5% in 3 months  Body Fat: mild depletion  Area of Body Fat Loss: orbital region  and upper arm region - triceps / biceps  Muscle Mass Loss: mild depletion  Area of Muscle Mass Loss: clavicle and acromion bone region - deltoid muscle, patellar region - quadricep muscle, and anterior thigh region - quadriceps muscles  Fluid Accumulation: mild  Edema: 1+ edema - trace and 3+ edema - moderate  Reduced  Strength: unable to obtain  A minimum of two characteristics is recommended for diagnosis of either severe or non-severe malnutrition.    Chart Review    Reason Seen: continuous nutrition monitoring and malnutrition screening tool (MST)    Malnutrition Screening Tool Results   Have you recently lost weight without trying?: Yes: 2-13 lbs  Have you been eating poorly because of a decreased appetite?: Yes (Appetite intact. Unable to get to food d/t immobilty)   MST Score: 2     Diagnosis: Rheumatologic arthritic flare  Elevated blood glucose  Iron deficiency anemia  Vitamin D deficiency  HTN    Relevant Medical History: Raynaud's syndrome and HTN    Nutrition-Related Medications: ergocalciferol, ferrous sulfate, methylprednisolone  Calorie Containing IV Medications: no significant kcals  "from medications at this time    Nutrition-Related Labs: 3/29-Gluc 210, Pro Tot 8.4, Alb 2.2, GFR >60, H&H 8.1/28.0      Diet/PN Order: Diet heart healthy  Oral Supplement Order: none  Tube Feeding Order: none  Appetite/Oral Intake: good/% of meals  Factors Affecting Nutritional Intake: none identified  Food/Quaker/Cultural Preferences: none reported  Food Allergies: no known food allergies       Wound(s):   None noted    Comments  3/29: Pt reports good po intake of breakfast this AM. Pt denies any GI complaints. Pt reports good appetite pta; states poor po intake  x 1 month because of inability to prepare meals d/t immobility/BLE weakness. Pt reports consuming various ONS (Boost, pediasure, pedialyte) at home. Pt ok to add chocolate Boost Plus for breakfast. Pt reports significant wt loss; states #, last weighed Dec 2022. Equates to ~15% wt loss x 3 months. Muscle/fat loss noted. Noted gluc elevated -- no DM hx; on methylprednisolone.      Anthropometrics    Height: 5' 4" (162.6 cm)    Last Weight: 57.8 kg (127 lb 6.8 oz) (23) Weight Method: Bed Scale  BMI (Calculated): 21.9  BMI Classification: normal (BMI 18.5-24.9)     Ideal Body Weight (IBW), Female: 120 lb     % Ideal Body Weight, Female (lb): 106.19 %                    Usual Body Weight (UBW), k.18 kg (#)  % Usual Body Weight: 84.95     Usual Weight Provided By: patient    Wt Readings from Last 5 Encounters:   23 57.8 kg (127 lb 6.8 oz)   23 51.7 kg (114 lb)   23 51.8 kg (114 lb 3.2 oz)   23 63 kg (138 lb 14.2 oz)     Weight Change(s) Since Admission:  Admit Weight: 57.8 kg (127 lb 6.8 oz) (23)  3/29: #, last weighed Dec 2022; equates to ~15% wt loss x 3 months    Estimated Needs    Weight Used For Calorie Calculations: 57.8 kg (127 lb 6.8 oz)  Energy Calorie Requirements (kcal): 1850 kcals (32 kcal/kg)  Energy Need Method: Kcal/kg  Weight Used For Protein Calculations: 57.8 kg " (127 lb 6.8 oz)  Protein Requirements: 70-75g (1.2-1.3g/kg)  Fluid Requirements (mL): 1850mL (1mL/kcal)  Temp (24hrs), Av.5 °F (36.9 °C), Min:97.6 °F (36.4 °C), Max:99.1 °F (37.3 °C)         Enteral Nutrition    Patient not receiving enteral nutrition at this time.    Parenteral Nutrition    Patient not receiving parenteral nutrition support at this time.    Evaluation of Received Nutrient Intake    Calories: meeting estimated needs  Protein: meeting estimated needs    Patient Education    Not applicable.    Nutrition Diagnosis     PES: Malnutrition related to inability to consume sufficient nutrients as evidenced by <75% intake x 1 month; >7.5% wt loss x 3 months; BLE weakness; muscle/fat loss. (new)    Interventions/Goals     Intervention(s): modified composition of meals/snacks, commercial beverage, multivitamin/mineral supplement therapy, and collaboration with other providers  Goal: Meet greater than 75% of nutritional needs by follow-up. (new)    Monitoring & Evaluation     Dietitian will monitor food and beverage intake, weight, and glucose/endocrine profile.  Nutrition Risk/Follow-Up: moderate (follow-up in 3-5 days)   Please consult if re-assessment needed sooner.

## 2023-03-29 NOTE — H&P
[facility] Internal Medicine History and Physical     Date of Admit: 3/28/2023  Current Hospital Day: 1     Chief Complaint:      Joint Pain and Hypertension (PT IN /AASI W CONTINUED JOINT PAIN, WORSE IN KNEES, NECK AND BACK. PT BP ELEVATED, REPORTS COMPLIANCE W MEDS. )    Subjective:     History of Present Illness:  Nela Shaver is a 46 yo F with PMH of Raynaud's syndrome and HTN who presented to [facility] ED on 3/28/23 for progressively worsening symmetric bilateral lower extremity weakness associated with bilateral knee pain and swelling. Weakness began around November 2022. Around that time she also noticed swelling of the hands over the metacarpals with no specific joint involvement as well as swelling of the knee joints. She reports decreased appetite over the course of her ailments and a 36 lb weight loss since Christmas. Beginning in March 2023 her LE weakness began progressing more rapidly accompanied by the swelling in her knees and hands becoming extremely painful. She has been unable to open a bottle of water or ambulate over the past 4 days. She has been defecating and urinating on herself in bed at home due to inability to raise from bed. Of note, she lives alone in a 2-story home. Reports symptoms are worse in the morning and improve throughout the day. Denies lightheadedness, dizziness, SOB, severe neck pain, calf pain, hemoptysis, rash, chest pain, constipation, diarrhea, fever/chills, n/v.     Review of Systems:  As per HPI.    Past Medical History:   Past Medical History:   Diagnosis Date    Arthritis       Past Surgical History:  History reviewed. No pertinent surgical history.    Allergies:  Review of patient's allergies indicates:  No Known Allergies    Home Medications:  Prior to Admission medications    Medication Sig Start Date End Date Taking? Authorizing Provider   amLODIPine (NORVASC) 5 MG tablet Take 1 tablet (5 mg total) by mouth once daily. 3/24/23 3/23/24 Yes BOY Reaves   blood  pressure monitor Kit 1 each by Misc.(Non-Drug; Combo Route) route 2 (two) times a day. 3/25/23   BOY Reaves   traMADoL (ULTRAM) 50 mg tablet Take 1 tablet (50 mg total) by mouth every 6 (six) hours as needed for Pain. 3/24/23   BOY Reaves     Family History:  Family History   Problem Relation Age of Onset    Hyperlipidemia Mother      Social History:  Social History     Tobacco Use    Smoking status: Former     Packs/day: 0.50     Types: Cigarettes    Smokeless tobacco: Never   Substance Use Topics    Alcohol use: Not Currently    Drug use: Never     Objective:     Vitals  BP: (!) 163/98  Temp: 99.1 °F (37.3 °C)  Temp Source: Tympanic  Pulse: 97  Resp: 19  SpO2: 97 %    Physical Examination:  General: Awake, alert, & oriented to person, place & time. No acute distress  Psychiatric: Mood and affect normal  HEENT: Redness to lateral aspect of left eye. Moist mucous membranes.  Cardiovascular: RRR. No murmurs, rubs, or gallups. No peripheral edema.  Pulmonary: CTAB. No wheezing, crackles, rales, or rhonchi. Breathing comfortably on room air.   Abdominal: Soft, non tender, non distended. Normoactive bowel sounds.   Extremities: Swelling and pain on palpation of bilateral hands. No swelling or deformation of DIP, PIP, or MCP. Edema to bilateral knees, no increased warmth or erythema.   Skin: Exposed skin is warm & dry.  Neuro: 2/5 strength to bilateral LE below knee, 3/5 to bilateral LE above knee. Exam limited due to pain.  strength 2/5 bilaterally.     Laboratory:  CMP:  Recent Labs   Lab 03/24/23  1351 03/28/23  1750    135*   K 3.2* 3.3*   CHLORIDE 104 98   CO2 24 27   BUN 8.8 8.4   CREATININE 0.83 0.69   GLUCOSE 108* 110*   CALCIUM 9.9 9.7   ALBUMIN 2.7* 2.3*   BILITOT 0.3 0.4   AST 12 44*   ALT <5 19   ALKPHOS 69 101     CBC:  Recent Labs   Lab 03/24/23  1351 03/28/23  1750   WBC 7.0 6.8   ABSNEUTRO 5.69 5.15   RBC 3.79* 3.63*   HGB 8.3* 7.7*   HCT 27.6* 25.9*   * 748*   MCV  72.8* 71.3*   RDW 18.9* 18.9*     FLP:   Recent Labs   Lab 03/24/23  1351   CHOL 227*   HDL 29*   .00*   TRIG 165*     Anemia:   Recent Labs   Lab 03/24/23  1351   IRON 10*   FERRITIN 222.95*   TRANS 161*   TIBC 181*   [id number] 390   FOLATE 6.6*       Radiology:  X-Ray Abdomen AP 1 View  Result Date: 3/28/2023  No acute abdominal radiographic abnormality. Electronically signed by: Chey Nash Date: 03/28/2023 Time: 18:00     Assessment & Plan:     Rheumatologic arthritic flare  - RF > 100, CCP positive, c-ANCA/p-ANCA negative. MAEGD and centromere protein antibody pending  - ESR 73,   - Will give Solu-Medrol 125 mg today, continue with 60 mg BID   - May need to initiate methotrexate therapy  - Rheumatology consult in AM    Iron deficiency anemia  - H/H 7.7/25.9 on admission  - MCV 72.7, iron 10  - Will give ferrous sulfate every other day    Vitamin D deficiency  - Vit D: 7  - Replete with ergocalciferol 50,000 U weekly    HTN  - Given labetalol IV x2 in ED  - Continue home amlodipine 5 mg qd    CODE STATUS: Full Code  Access: PIV  Antimicrobials: none  Diet: Diet heart healthy   DVT Prophylaxis: Lovenox ppx  GI Prophylaxis: none  Fluids: none    Disposition: Admitted with rheumatologic flare initiated on IV steroids. Will consult rheumatology in AM.    Elgin Trujillo MD  [facility] - PGY-1

## 2023-03-29 NOTE — MEDICAL/APP STUDENT
"[facility] Internal Medicine Progress Note     Nela Shaver  [id number]  03/29/2023 9:36 AM    Chief Complaint   Patient presents with    Joint Pain    Hypertension     PT IN /AASI W CONTINUED JOINT PAIN, WORSE IN KNEES, NECK AND BACK. PT BP ELEVATED, REPORTS COMPLIANCE W MEDS.        Brief HPI: Nela Shaver is a 47 y.o. female with a PMH of Reynaud's syndrome, hypertension, and arthritis who presented to the [facility] ED on 3/28/23 due to progressively worsening symmetric, bilateral lower extremity weakness with associated bilateral hand and knee swelling and pain. Reports her weakness began 11/2022 with accompanying hand and knee pain and swelling. This weakness decreased her ability to perform her ADLs, causing her to lose 36 lbs since 12/2022 due to inability to prepare and eat food. Her symptoms had been slowly progressing until 3/2023, when she noticed rapid decline in extremity strength as well as increased knee swelling and hand pain. Since approximately 3/24/2023 patient has been unable to open water bottles or ambulate to use restroom which lead to her urinating and defecating on herself in bed.    The patient reports her symptoms are worse in the morning and improve over the day. She denies dizziness, lightheadedness, shortness of breath, severe neck pain, calf pain, hemoptysis, rash, chest pain, constipation, diarrhea, nausea, vomiting, chills, or fever.    Interval History: MIRTHA REYES with improved BP, decreasing to 146/84 this AM. Patient reports overall improved pain today, stating she came in at a 10/10 and that today it has decreased. She seemed unable to quantify the decrease at first, saying it at an "a 8/10 or a 6/10" but then said that because "I told the nurse earlier I was at a 5/10" that that was where it is at now. Patient states her right hand no longer hurts today but that her left wrist and metacarpals still do, and that she has been "hiding" that hand under her breakfast tray or blankets "so no one " "will try to shake it." Patient endorses improved bilateral lower extremity strength today, with slight L>R strength in feet noted on physical exam. Patient endorses bilateral  strength is still decreased which was also noted on exam of right hand; left was deferred due to pain. Patient affect aloof and almost childlike at times. Of note, patient accompanied today by person named "Carlos". He states awkwardly that he isn't her partner but that "we have a kid together" and "we're as close to being partners as you can get."    Blood pressure (!) 146/84, pulse 65, temperature 97.7 °F (36.5 °C), temperature source Oral, resp. rate 18, height 5' 4" (1.626 m), weight 57.8 kg (127 lb 6.8 oz), last menstrual period 02/27/2023, SpO2 100 %, not currently breastfeeding.    Physical Exam  Vitals and nursing note reviewed.   Constitutional:       General: She is not in acute distress.     Appearance: Normal appearance. She is normal weight. She is not ill-appearing.   HENT:      Head: Normocephalic and atraumatic.      Right Ear: External ear normal.      Left Ear: External ear normal.      Nose: Nose normal. No congestion or rhinorrhea.      Mouth/Throat:      Mouth: Mucous membranes are moist.      Pharynx: Oropharynx is clear.   Eyes:      Extraocular Movements: Extraocular movements intact.      Conjunctiva/sclera: Conjunctivae normal.   Cardiovascular:      Rate and Rhythm: Normal rate and regular rhythm.      Pulses: Normal pulses.      Heart sounds: Normal heart sounds. No murmur heard.  Pulmonary:      Effort: Pulmonary effort is normal. No respiratory distress.      Breath sounds: Normal breath sounds. No wheezing.   Abdominal:      General: Abdomen is flat. Bowel sounds are normal. There is no distension.      Palpations: Abdomen is soft.      Tenderness: There is no abdominal tenderness. There is no guarding.   Genitourinary:     Comments: Deferred.  Musculoskeletal:      Right wrist: Swelling and tenderness " present. No effusion.      Left wrist: No swelling, effusion or tenderness.      Right hand: Tenderness present.      Left hand: No tenderness.      Cervical back: Normal range of motion and neck supple. No rigidity.      Right knee: Swelling and effusion present. Decreased range of motion. No tenderness.      Left knee: Swelling and effusion present. Decreased range of motion. No tenderness.      Right ankle: No swelling. No tenderness.      Left ankle: No swelling. No tenderness.   Skin:     General: Skin is warm and dry.   Neurological:      General: No focal deficit present.      Mental Status: She is alert and oriented to person, place, and time.      GCS: GCS eye subscore is 4. GCS verbal subscore is 5. GCS motor subscore is 6.      Cranial Nerves: No cranial nerve deficit or facial asymmetry.      Motor: Weakness present. No tremor or atrophy.      Comments: Bilateral lower extremity weakness, with strength L>R.  strength weakness.   Psychiatric:         Mood and Affect: Mood normal.         Judgment: Judgment normal.      Comments: Somewhat odd thought content with corresponding behavior (hiding hand). Was aloof on interview.        Labs  Lab Results   Component Value Date    WBC 5.1 03/29/2023    HGB 8.1 (L) 03/29/2023    HCT 28.0 (L) 03/29/2023    MCV 72.7 (L) 03/29/2023     (H) 03/29/2023         Lab Results   Component Value Date     03/29/2023    K 4.4 03/29/2023    CO2 26 03/29/2023    BUN 11.7 03/29/2023    CREATININE 0.76 03/29/2023    CALCIUM 10.1 03/29/2023    EGFRNORACEVR >60 03/29/2023       Micro  3/24/2023:   Knee joint aspirate: Many WBC, no bacteria. Chlamydia and gonorrhea not detected on PCR. No culture growth at 4 days.    Imaging  3/7/2023:  X-Ray Knee 3 View Bilateral: Mild degenerative changes of the knees with bilateral joint effusions.    3/28/2023:  X-Ray Abdominal AP 1 View: No acute abdominal radiographic abnormality.  X-Ray Neck Soft Tissue: No acute abnormality  identified.  X-Ray Chest PA And Lateral: No acute abnormality of the chest.    Assessment and Plan  Rheumatologic arthritic flare  - RF > 100, CCP positive, c-ANCA/p-ANCA negative. MAGED and centromere protein antibody pending  - ESR 73,   - CK 19, unlikely to be concurrent myositis   - Prednisone 20 mg PO daily per rheumatology recommendation     Iron deficiency anemia  - H/H 7.7/25.9 on admission --> 8.1/28 today  - MCV 72.7, iron 10 --> MCV 71.3 today  - Will give ferrous sulfate every other day     Vitamin D deficiency  - Vit D: 7  - Replete with ergocalciferol 50,000 U weekly     HTN  - Given labetalol IV x2 in ED  - Continue home amlodipine 5 mg qd      CODE STATUS: Full Code  Access: PIV  Antimicrobials: none  Diet: Diet heart healthy   DVT Prophylaxis: Lovenox ppx  GI Prophylaxis: none  Fluids: none     Disposition: Admitted with rheumatologic flare. Now on PO steroids, disposition pending on symptomatic improvement.    Carlos Wlison  [facility] MARIO, MS3

## 2023-03-29 NOTE — CONSULTS
[facility]  Consult Note    Patient Name: Nela Shaver  : 1975  MRN: [id number]  Admission Date: 3/28/2023  Attending Provider: Yadira Myers MD  Primary Care Physician: BOY Reaves    Date of Consultation: 3/29/23    Consultation Requested By: Dr Myers.    Reason for Consultation: Joint pain.    Subjective:     HPI: Ms Nela Shaver is a 47 y.o. Black or  female with past medical history of anemia p/w joint pain and lower extremity weakness.     Complaining of pain in bilateral hands, elbows, knees and ankles for the last 6 months, progressively got worse over the last couple of months.  She was not able to ambulate because of joint pain.  Bedridden for last 1 month because of knee pain.  Complaining of weakness in bilateral legs.  Also had lost weight last 4 months.  Admits swelling in bilateral hands and knees.  Joint pain worse with activities.  Morning stiffness for more than 3 hours sometimes.    Complaining of Raynaud's for the last 4 years, symptoms occur during winter, episodes happen 3 to 4 times a week during winter.  Does not have color changes all the time.  Never had ulcers in the fingertips or toes.    Denies fevers, rashes, oral and nasal ulcers, history of DVT or PE, history of stroke or seizures, history of MI, history of malignancies, history of inflammatory eye diseases, history of inflammatory bowel disease.  Family history of autoimmune disease:  None  Smoking:  Nonsmoker  Pregnancies:  1 Miscarriages none    Patient has No Known Allergies.     Past Medical History:  has a past medical history of Arthritis.    Procedure History:  has no past surgical history on file.    Family History: family history includes Hyperlipidemia in her mother.    Social History:  reports that she has quit smoking. Her smoking use included cigarettes. She smoked an average of .5 packs per day. She has never used smokeless tobacco. She reports that she does not  currently use alcohol. She reports that she does not use drugs.    Review of systems:   CONSTITUTIONAL: negative with no fatigue or fevers  SKIN: negative with no recent rashes  EYES: negative with no complaints of dry irritated eyes  ENT: negative with no mouth dryness or sores  ENDO: negative with no hypothyroid symptoms  HEME: negative, with no history of anemia or DVT  CV: negative without exertional chest pain, palpitations, or edema  RESP: negative without cough, dyspnea, or pleuritic pain  GI: negative without nausea, vomiting, heartburn, or bleeding  NEURO: negative, with no imbalance and no numbness or tingling of the hands or feet  MUSCULOSKELETAL: as per HPI    Inpatient Medications:     Current Facility-Administered Medications:     acetaminophen tablet 650 mg, 650 mg, Oral, Q6H PRN, Jourdan Reyes MD    amLODIPine tablet 5 mg, 5 mg, Oral, Daily, Jourdan Reyes MD, 5 mg at 03/29/23 0910    dextrose 10% bolus 125 mL 125 mL, 12.5 g, Intravenous, PRN, Elgin Trujillo MD    dextrose 10% bolus 250 mL 250 mL, 25 g, Intravenous, PRN, Elgin Trujillo MD    dextrose 40 % gel 15,000 mg, 15 g, Oral, PRN, Yadira Myers MD    enoxaparin injection 40 mg, 40 mg, Subcutaneous, Daily, Jourdan Reyes MD, 40 mg at 03/28/23 1927    ergocalciferol capsule 50,000 Units, 50,000 Units, Oral, Q7 Days, Jourdan Reyes MD, 50,000 Units at 03/29/23 0910    ferrous sulfate tablet 1 each, 1 tablet, Oral, Every other day, Jourdan Reyes MD, 1 each at 03/29/23 0910    glucagon (human recombinant) injection 1 mg, 1 mg, Intramuscular, PRN, Elgin Trujillo MD    glucose chewable tablet 16 g, 16 g, Oral, PRN, Elgin Trujillo MD    glucose chewable tablet 24 g, 24 g, Oral, PRN, Elgin Trujillo MD    hydrALAZINE injection 10 mg, 10 mg, Intravenous, Q6H PRN, Jourdan Reyes MD    insulin aspart U-100 injection 0-5 Units, 0-5 Units, Subcutaneous, QID (AC + HS) PRN, Elgin Trujillo MD    ketorolac injection 30 mg, 30 mg, Intravenous, Q6H PRN, Jourdan Reyes MD     labetalol 20 mg/4 mL (5 mg/mL) IV syring, 10 mg, Intravenous, Q6H PRN, Jourdan Reyes MD    naloxone 0.4 mg/mL injection 0.02 mg, 0.02 mg, Intravenous, PRN, Jourdan Reyes MD    [START ON 3/30/2023] pantoprazole EC tablet 40 mg, 40 mg, Oral, Daily, Jourdan Reyes MD    [START ON 3/30/2023] predniSONE tablet 20 mg, 20 mg, Oral, Daily, Elgin Trujillo MD    sodium chloride 0.9% flush 10 mL, 10 mL, Intravenous, Q12H PRN, Jourdan Reyes MD     Objective:     Vital Signs (Most Recent):  Temp: 97.8 °F (36.6 °C) (03/29/23 1518)  Pulse: 77 (03/29/23 1518)  Resp: 18 (03/29/23 0300)  BP: 135/85 (03/29/23 1518)  SpO2: 99 % (03/29/23 1518)   Vital Signs (24h Range):  Temp:  [97.6 °F (36.4 °C)-98.7 °F (37.1 °C)] 97.8 °F (36.6 °C)  Pulse:  [] 77  Resp:  [17-21] 18  SpO2:  [90 %-100 %] 99 %  BP: (131-165)/() 135/85     Weight: 57.8 kg (127 lb 6.8 oz) (03/28/23 2030)  Body mass index is 21.87 kg/m².  Body surface area is 1.62 meters squared.      Intake/Output Summary (Last 24 hours) at 3/29/2023 1715  Last data filed at 3/29/2023 0654  Gross per 24 hour   Intake 300 ml   Output 200 ml   Net 100 ml       Physical Exam:   General Appearance: no acute distress and cooperative  Skin: Skin color, texture, turgor normal. No rashes or lesions.  Eyes: conjunctivae/corneas clear. PERRL, EOM's intact.   ENT: No oral or nasal ulcers.  Neck:  Neck supple. No adenopathy.   Lungs: CTA throughout without crackles, rhonchi, or wheezes.   Heart: RRR w/o S3, S4, or murmurs.   Abdomen: Soft, non-tender, no masses, organomegaly, rebound or guarding.  Neuro: CN II-XII GI, DTRs +2/4 throughout, sensory innervation intact, no pathologic reflexes elicited.  4/5 strength in bilateral proximal lower extremities.  Musculoskeletal:  Tenderness with palpation of MCPs on left with swelling and synovial thickness, synovial thickness of left wrist.  No tenderness in MCPs of right hand, no tenderness in right wrist.  No swelling or tenderness in bilateral  elbows and shoulders.  Warmth and swelling in bilateral knees, tenderness with range of motion of bilateral knees.  Tenderness with palpation of right ankle with mild swelling.  Tenderness in MTPs on left.    Significant Labs:    03/24/2023; C Anca and p-ANCA negative.  Complements normal.  Anti CCP positive.  Rheumatoid factor IgA greater than 100, IgM greater than 100, IgG 46.  Lyme serologies negative.  Parvovirus antibody negative.  Uric acid and synovial fluid 3.5.  Vitamin-D low 7.1.  03/29/2023; hemoglobin 8.1.  Elevated platelet count 821.  CMP okay.  Glucose elevated to 20.  CPK low 19.  .      Blood Culture: No results for input(s): LABBLOO in the last 24 hours.  CBC:   Recent Labs   Lab 03/28/23  1750 03/29/23  0411   WBC 6.8 5.1   HGB 7.7* 8.1*   HCT 25.9* 28.0*   * 821*       CMP:   Recent Labs   Lab 03/29/23  0411   CALCIUM 10.1   ALBUMIN 2.2*      K 4.4   CO2 26   BUN 11.7   CREATININE 0.76   ALKPHOS 103   ALT 17   AST 26   BILITOT 0.3       CRP:   Recent Labs   Lab 03/28/23  1750   .20*       ESR:   Recent Labs   Lab 03/28/23  1750   SEDRATE 73*         All pertinent lab results from the last 24 hours have been reviewed.    Significant Imaging:  Arthritis survery: I have reviewed all results within the past 24 hours and my personal findings are:  below.  X-Ray Chest PA And Lateral   Final Result      No acute abnormality of the chest.         Electronically signed by: Gosia Myers   Date:    03/28/2023   Time:    19:39      X-Ray Neck Soft Tissue   Final Result      No acute abnormality identified.         Electronically signed by: Gosia Myers   Date:    03/28/2023   Time:    19:40      X-Ray Abdomen AP 1 View   Final Result      No acute abdominal radiographic abnormality.         Electronically signed by: Chey Nash   Date:    03/28/2023   Time:    18:00          03/07/2023 x-ray of bilateral knees showed mild DJD changes with bilateral joint  effusions.    Assessment/Plan:     47-year-old pleasant  woman with history of Raynaud's phenomenon, anemia secondary to iron deficiency presented to ER for joint pain and muscle weakness.    1.  Rheumatoid arthritis:  Synovitis in left hand, bilateral knees, ankles and MTPs on exam.  High titer rheumatoid factor, anti CCP positive.  She had seropositive rheumatoid arthritis.  Elevated inflammatory markers.  Improvement of joint pain with steroids.  - convert to oral steroids, prednisone 20 mg daily and after 2 weeks can decrease further to 10 mg daily.  - start meloxicam 15 mg daily or naproxen 500 mg b.i.d.  - start PPI.  - start methotrexate 15 mg per week with folic acid 1 mg daily.  Discussed the risks and benefits of methotrexate with the patient.  Patient currently not sexually active, patient aware she should not get pregnant on methotrexate.  She will follow-up with her gynecologist and she is considering tubal ligation.  - follow-up with rheumatology as outpatient in 3-4 weeks.  - agree with starting ergocalciferol for vitamin-D deficiency.  - we will get baseline x-rays of bilateral hands and feet.  - decreased strength in bilateral proximal lower extremities could be secondary to joint pain and disuse.  CPK ok.  Aldolase pending.  MAGED pending.  Recommend physical therapy for muscle strengthening exercises.    2. Iron-deficiency anemia:  Defer further evaluation and treatment of iron-deficiency anemia to primary team.    Discussed risks and benefits of Methotrexate (MTX) in detail. MTX is an immunosuppressant medication.  When used in high doses (such as in cancer), it may have many side effects.  The doses that are used in rheumatological disorders are much lower.  Side effects were explained to the patient, including kidney and liver damage, bone marrow suppression, increased infection risk, mouth sores, hair loss, nausea, GI symptoms.   Start Methotrexate at 15mg per week.  Start folic  1mg daily to prevent some of the side effects of methotrexate.  Labwork: CBC and a CMP should be checked at baseline, monthly for the first few months and every 3 months afterwards.  Check a Hepatitis Panel and a Chest X-Ray prior to initiation of methotrexate.  Patient to call with any concerns and adverse effects of MTX.    Methotrexate is also teratogenic, so birth control is needed while on this medication if applicable.  Counselled on limiting alcohol use to 1-2 drinks/week while on methotrexate given potential liver toxicity.     Plan discussed with primary team.     Thank you for your consult. I will sign off. Please contact us if you have any additional questions.    Vera Triplett MD  Rheumatology

## 2023-03-30 VITALS
HEART RATE: 79 BPM | OXYGEN SATURATION: 99 % | DIASTOLIC BLOOD PRESSURE: 87 MMHG | RESPIRATION RATE: 16 BRPM | WEIGHT: 127.44 LBS | BODY MASS INDEX: 21.76 KG/M2 | SYSTOLIC BLOOD PRESSURE: 144 MMHG | TEMPERATURE: 98 F | HEIGHT: 64 IN

## 2023-03-30 PROBLEM — M06.9 RHEUMATOID ARTHRITIS FLARE: Status: ACTIVE | Noted: 2023-03-30

## 2023-03-30 PROBLEM — M06.9 RHEUMATOID ARTHRITIS FLARE: Status: RESOLVED | Noted: 2023-03-30 | Resolved: 2023-03-30

## 2023-03-30 LAB
ALBUMIN SERPL-MCNC: 2.4 G/DL (ref 3.5–5)
ALBUMIN/GLOB SERPL: 0.4 RATIO (ref 1.1–2)
ALDOLASE SERPL-CCNC: 6.8 U/L
ALP SERPL-CCNC: 101 UNIT/L (ref 40–150)
ALT SERPL-CCNC: 23 UNIT/L (ref 0–55)
ANA SER QL HEP2 SUBST: NORMAL
AST SERPL-CCNC: 33 UNIT/L (ref 5–34)
BASOPHILS # BLD AUTO: 0.01 X10(3)/MCL (ref 0–0.2)
BASOPHILS NFR BLD AUTO: 0.1 %
BILIRUBIN DIRECT+TOT PNL SERPL-MCNC: 0.1 MG/DL
BUN SERPL-MCNC: 28.5 MG/DL (ref 7–18.7)
CALCIUM SERPL-MCNC: 10.5 MG/DL (ref 8.4–10.2)
CENTROMERE PROTEIN ANTIBODY (OHS): NEGATIVE
CHLORIDE SERPL-SCNC: 104 MMOL/L (ref 98–107)
CO2 SERPL-SCNC: 26 MMOL/L (ref 22–29)
CREAT SERPL-MCNC: 0.8 MG/DL (ref 0.55–1.02)
EOSINOPHIL # BLD AUTO: 0 X10(3)/MCL (ref 0–0.9)
EOSINOPHIL NFR BLD AUTO: 0 %
ERYTHROCYTE [DISTWIDTH] IN BLOOD BY AUTOMATED COUNT: 19.1 % (ref 11.5–17)
GAMMA INTERFERON BACKGROUND BLD IA-ACNC: 0.04 IU/ML
GFR SERPLBLD CREATININE-BSD FMLA CKD-EPI: >60 MLS/MIN/1.73/M2
GLOBULIN SER-MCNC: 6.3 GM/DL (ref 2.4–3.5)
GLUCOSE SERPL-MCNC: 131 MG/DL (ref 74–100)
HCT VFR BLD AUTO: 31.7 % (ref 37–47)
HEMATOLOGIST REVIEW: NORMAL
HGB BLD-MCNC: 9.1 G/DL (ref 12–16)
IMM GRANULOCYTES # BLD AUTO: 0.03 X10(3)/MCL (ref 0–0.04)
IMM GRANULOCYTES NFR BLD AUTO: 0.3 %
LYMPHOCYTES # BLD AUTO: 1.24 X10(3)/MCL (ref 0.6–4.6)
LYMPHOCYTES NFR BLD AUTO: 11.2 %
M TB IFN-G BLD-IMP: ABNORMAL
M TB IFN-G CD4+ BCKGRND COR BLD-ACNC: 0.06 IU/ML
M TB IFN-G CD4+CD8+ BCKGRND COR BLD-ACNC: 0 IU/ML
MAGNESIUM SERPL-MCNC: 2.2 MG/DL (ref 1.6–2.6)
MCH RBC QN AUTO: 21.1 PG (ref 27–31)
MCHC RBC AUTO-ENTMCNC: 28.7 G/DL (ref 33–36)
MCV RBC AUTO: 73.4 FL (ref 80–94)
MITOGEN IGNF BCKGRD COR BLD-ACNC: 0.03 IU/ML
MONOCYTES # BLD AUTO: 0.43 X10(3)/MCL (ref 0.1–1.3)
MONOCYTES NFR BLD AUTO: 3.9 %
NEUTROPHILS # BLD AUTO: 9.33 X10(3)/MCL (ref 2.1–9.2)
NEUTROPHILS NFR BLD AUTO: 84.5 %
NRBC BLD AUTO-RTO: 0 %
PHOSPHATE SERPL-MCNC: 2.6 MG/DL (ref 2.3–4.7)
PLATELET # BLD AUTO: 987 X10(3)/MCL (ref 130–400)
PLATELETS.RETICULATED NFR BLD AUTO: 1 % (ref 0.9–11.2)
PMV BLD AUTO: 8.9 FL (ref 7.4–10.4)
POCT GLUCOSE: 125 MG/DL (ref 70–110)
POCT GLUCOSE: 126 MG/DL (ref 70–110)
POTASSIUM SERPL-SCNC: 4 MMOL/L (ref 3.5–5.1)
PROT SERPL-MCNC: 8.7 GM/DL (ref 6.4–8.3)
RBC # BLD AUTO: 4.32 X10(6)/MCL (ref 4.2–5.4)
SODIUM SERPL-SCNC: 141 MMOL/L (ref 136–145)
WBC # SPEC AUTO: 11 X10(3)/MCL (ref 4.5–11.5)

## 2023-03-30 PROCEDURE — 84100 ASSAY OF PHOSPHORUS: CPT | Performed by: STUDENT IN AN ORGANIZED HEALTH CARE EDUCATION/TRAINING PROGRAM

## 2023-03-30 PROCEDURE — 97110 THERAPEUTIC EXERCISES: CPT

## 2023-03-30 PROCEDURE — 83735 ASSAY OF MAGNESIUM: CPT | Performed by: STUDENT IN AN ORGANIZED HEALTH CARE EDUCATION/TRAINING PROGRAM

## 2023-03-30 PROCEDURE — 63600175 PHARM REV CODE 636 W HCPCS

## 2023-03-30 PROCEDURE — 80053 COMPREHEN METABOLIC PANEL: CPT | Performed by: STUDENT IN AN ORGANIZED HEALTH CARE EDUCATION/TRAINING PROGRAM

## 2023-03-30 PROCEDURE — 85060 BLOOD SMEAR INTERPRETATION: CPT | Performed by: STUDENT IN AN ORGANIZED HEALTH CARE EDUCATION/TRAINING PROGRAM

## 2023-03-30 PROCEDURE — 97535 SELF CARE MNGMENT TRAINING: CPT

## 2023-03-30 PROCEDURE — 85025 COMPLETE CBC W/AUTO DIFF WBC: CPT | Performed by: STUDENT IN AN ORGANIZED HEALTH CARE EDUCATION/TRAINING PROGRAM

## 2023-03-30 PROCEDURE — 63600175 PHARM REV CODE 636 W HCPCS: Performed by: INTERNAL MEDICINE

## 2023-03-30 PROCEDURE — 25000003 PHARM REV CODE 250: Performed by: INTERNAL MEDICINE

## 2023-03-30 PROCEDURE — 25000003 PHARM REV CODE 250: Performed by: STUDENT IN AN ORGANIZED HEALTH CARE EDUCATION/TRAINING PROGRAM

## 2023-03-30 PROCEDURE — 97116 GAIT TRAINING THERAPY: CPT

## 2023-03-30 RX ORDER — ERGOCALCIFEROL 1.25 MG/1
50000 CAPSULE ORAL
Qty: 7 CAPSULE | Refills: 0 | Status: SHIPPED | OUTPATIENT
Start: 2023-04-05 | End: 2023-04-27 | Stop reason: SDUPTHER

## 2023-03-30 RX ORDER — PREDNISONE 10 MG/1
TABLET ORAL
Qty: 42 TABLET | Refills: 0 | Status: SHIPPED | OUTPATIENT
Start: 2023-03-30 | End: 2023-04-27 | Stop reason: SDUPTHER

## 2023-03-30 RX ORDER — MELOXICAM 15 MG/1
15 TABLET ORAL DAILY
Qty: 30 TABLET | Refills: 2 | Status: SHIPPED | OUTPATIENT
Start: 2023-03-30 | End: 2023-04-27 | Stop reason: SDUPTHER

## 2023-03-30 RX ORDER — AMLODIPINE BESYLATE 10 MG/1
10 TABLET ORAL DAILY
Qty: 30 TABLET | Refills: 3 | Status: SHIPPED | OUTPATIENT
Start: 2023-03-31 | End: 2024-03-30

## 2023-03-30 RX ORDER — AMLODIPINE BESYLATE 10 MG/1
10 TABLET ORAL DAILY
Status: DISCONTINUED | OUTPATIENT
Start: 2023-03-30 | End: 2023-03-30 | Stop reason: HOSPADM

## 2023-03-30 RX ORDER — PANTOPRAZOLE SODIUM 40 MG/1
40 TABLET, DELAYED RELEASE ORAL DAILY
Qty: 30 TABLET | Refills: 3 | Status: SHIPPED | OUTPATIENT
Start: 2023-03-31 | End: 2023-05-25 | Stop reason: ALTCHOICE

## 2023-03-30 RX ORDER — FERROUS SULFATE 325(65) MG
325 TABLET, DELAYED RELEASE (ENTERIC COATED) ORAL EVERY OTHER DAY
Qty: 30 TABLET | Refills: 0 | Status: SHIPPED | OUTPATIENT
Start: 2023-03-30 | End: 2023-04-27 | Stop reason: SDUPTHER

## 2023-03-30 RX ORDER — FOLIC ACID 1 MG/1
1 TABLET ORAL DAILY
Qty: 30 TABLET | Refills: 2 | Status: SHIPPED | OUTPATIENT
Start: 2023-03-31 | End: 2023-04-27 | Stop reason: SDUPTHER

## 2023-03-30 RX ADMIN — PANTOPRAZOLE SODIUM 40 MG: 40 TABLET, DELAYED RELEASE ORAL at 08:03

## 2023-03-30 RX ADMIN — METHOTREXATE SODIUM 15 MG: 2.5 TABLET ORAL at 08:03

## 2023-03-30 RX ADMIN — PREDNISONE 20 MG: 20 TABLET ORAL at 08:03

## 2023-03-30 RX ADMIN — AMLODIPINE BESYLATE 10 MG: 10 TABLET ORAL at 08:03

## 2023-03-30 RX ADMIN — FOLIC ACID 1 MG: 1 TABLET ORAL at 08:03

## 2023-03-30 NOTE — PT/OT/SLP PROGRESS
"Physical Therapy Treatment    Patient Name:  Nela Shaver   MRN:  [id number]    Recommendations:     Discharge Recommendations: home with home health  Discharge Equipment Recommendations: walker, rolling  Barriers to discharge:  fall risk, decreased strength and endurance, decreased gait distances, barriers to enter home    Assessment:     Nela Shaver is a 47 y.o. female admitted with a medical diagnosis of Rheumatoid arthritis flare.  She presents with the following impairments/functional limitations: weakness, impaired endurance, impaired self care skills, impaired functional mobility, gait instability, impaired balance, decreased lower extremity function, decreased upper extremity function. Pt displayed increased gait distances today and decreased pain levels. PT spoke with MD prior to session about possible D/C today and pt's POC. Pt required decreased need for assistance with all activities during session possibly due to improved strength and decreased pain. PT performed Co-treat with OT.     Rehab Prognosis: Good; patient would benefit from acute skilled PT services to address these deficits and reach maximum level of function.    Recent Surgery: * No surgery found *      Plan:     During this hospitalization, patient to be seen  (3-5 times per week) to address the identified rehab impairments via gait training, therapeutic activities, therapeutic exercises, neuromuscular re-education and progress toward the following goals:    Plan of Care Expires:  04/26/23    Subjective     Chief Complaint: "I am doing much better than yesterday."  Patient/Family Comments/goals: "I think that I would like to go home today and would like a print out of some exercises."  Pain/Comfort:  Pain Rating 1: 0/10  Pain Rating Post-Intervention 1: 0/10      Objective:     Communicated with RN and MD prior to session.  Patient found HOB elevated with peripheral IV (Pt's friend in room during session.) upon PT entry to room.     General " Precautions: Standard, fall  Orthopedic Precautions: N/A  Braces: N/A  Respiratory Status: Room air     Functional Mobility:  Bed Mobility:     Supine to Sit: stand by assistance (increased time to perform)  Transfers:     Sit to Stand:  contact guard assistance with rolling walker (from EOB, increased time to perform, improved standing posture)  Gait: 60ft with RW at CGA (severe decreased gait speed with increased time to perform, step to gait pattern, verbal cueing to increase R and L hip flexion and ankle DF, seated rest break at end of gait, narrow base of support during gait)      Treatment & Education:  Pt given printed handout and review of hand out of HEP.     Patient left sitting edge of bed with all lines intact, call button in reach, RN notified, and OT and Pt's friend present.    GOALS:   Multidisciplinary Problems       Physical Therapy Goals       Not on file              Multidisciplinary Problems (Resolved)          Problem: Physical Therapy    Goal Priority Disciplines Outcome Goal Variances Interventions   Physical Therapy Goal   (Resolved)     PT, PT/OT Met     Description: Goals to be met by: Discharge     Patient will increase functional independence with mobility by performing:    -. Supine to sit with Stand-by Assistance  -. Sit to supine with Stand-by Assistance  -. Sit to stand transfer with Contact Guard Assistance  -. Bed to chair transfer with Contact Guard Assistance using Rolling Walker  -. Gait  x 90 feet with Contact Guard Assistance using Rolling Walker.                          Time Tracking:     PT Received On: 03/30/23  PT Start Time: 0743     PT Stop Time: 0815  PT Total Time (min): 32 min   PT assisted OT: 10 minutes    Billable Minutes: Gait Training 22 minutes    Treatment Type: Treatment  PT/PTA: PT           03/30/2023

## 2023-03-30 NOTE — DISCHARGE SUMMARY
[facility] Discharge Summary    Admitting Physician: Yadira Myers MD  Attending Physician: Yadira Myers MD  Date of Admit: 3/28/2023  Date of Discharge: 3/30/2023    Condition: Stable  Outcome: Condition has improved and patient is now ready for discharge.  DISPOSITION: Home or Self Care    Discharge Diagnoses     Patient Active Problem List   Diagnosis    Polyarthralgia    Elevated C-reactive protein (CRP)    Elevated sed rate    Adult BMI <19 kg/sq m    Rapidly progressive weakness    Bilateral knee effusions    Primary hypertension    Rheumatoid arthritis flare     Principal Problem:  Rheumatoid arthritis flare    Consultants and Procedures     Consultants:  IP CONSULT TO INTERNAL MEDICINE  IP CONSULT TO RHEUMATOLOGY    Procedures:   * No surgery found *     Brief Admission History      Nela Shaver is a 46 yo F with PMH of Raynaud's syndrome and HTN who presented to [facility] ED on 3/28/23 for progressively worsening symmetric bilateral lower extremity weakness associated with bilateral knee pain and swelling. Weakness began around November 2022. Around that time she also noticed swelling of the hands over the metacarpals with no specific joint involvement as well as swelling of the knee joints. She reports decreased appetite over the course of her ailments and a 36 lb weight loss since Christmas. Beginning in March 2023 her LE weakness began progressing more rapidly accompanied by the swelling in her knees and hands becoming extremely painful. She has been unable to open a bottle of water or ambulate over the past 4 days. She has been defecating and urinating on herself in bed at home due to inability to raise from bed. Of note, she lives alone in a 2-story home. Reports symptoms are worse in the morning and improve throughout the day. Denies lightheadedness, dizziness, SOB, severe neck pain, calf pain, hemoptysis, rash, chest pain, constipation, diarrhea, fever/chills, n/v.     Hospital Course with  "Pertinent Findings     Patient was admitted for new onset rheumatologic arthritic flare. She was treated with steroids which significantly improved her symptoms. Rheumatology was consulted due to patient having no diagnosis if autoimmune disease; subsequently diagnosed with rheumatoid arthritis while inpatient. She was sent home on methotrexate therapy and a prednisone taper for the next month until she can be seen by rheumatology on 4/27/23. She was offered inpatient physical therapy however declined. She was discharged home in the care of her ex- and son until she can improve enough to live on her own. Return to ED precautions were discussed at bedside.     Discharge physical exam:  Vitals  BP: (!) 144/87  Temp: 97.7 °F (36.5 °C)  Temp Source: Oral  Pulse: 79  Resp: 16  SpO2: 99 %  Height: 5' 4" (162.6 cm)  Weight: 57.8 kg (127 lb 6.8 oz)    General: Awake, alert, & oriented to person, place & time. No acute distress.  Psychiatric: Mood and affect normal  HEENT: Redness to lateral aspect of left eye. Moist mucous membranes.  Cardiovascular: RRR. No murmurs, rubs, or gallups. No peripheral edema.  Pulmonary: CTAB. No wheezing, crackles, rales, or rhonchi. Breathing comfortably on room air.   Abdominal: Soft, non tender, non distended. Normoactive bowel sounds.   Extremities: Swelling and pain on palpation of bilateral hands. No swelling or deformation of DIP, PIP, or MCP. Edema to bilateral knees, no increased warmth or erythema.   Skin: Exposed skin is warm & dry.  Neuro: Exam limited due to pain. 4+/5 strength to bilateral LE below knee, 4+/5 to bilateral LE above knee.  strength 4/5 bilaterally.    TIME SPENT ON DISCHARGE: 60 minutes    Discharge Medications        Medication List        START taking these medications      ergocalciferol 50,000 unit Cap  Commonly known as: ERGOCALCIFEROL  Take 1 capsule (50,000 Units total) by mouth every 7 days.  Start taking on: April 5, 2023     ferrous sulfate 325 " (65 FE) MG EC tablet  Take 1 tablet (325 mg total) by mouth every other day.     folic acid 1 MG tablet  Commonly known as: FOLVITE  Take 1 tablet (1 mg total) by mouth once daily.  Start taking on: March 31, 2023     meloxicam 15 MG tablet  Commonly known as: MOBIC  Take 1 tablet (15 mg total) by mouth once daily.     methotrexate 15 MG tablet  Commonly known as: TREXALL  Take 1 tablet (15 mg total) by mouth every 7 days.  Start taking on: April 6, 2023     pantoprazole 40 MG tablet  Commonly known as: PROTONIX  Take 1 tablet (40 mg total) by mouth once daily.  Start taking on: March 31, 2023     predniSONE 10 MG tablet  Commonly known as: DELTASONE  Take 2 tablets (20 mg total) by mouth once daily for 14 days, THEN 1 tablet (10 mg total) once daily for 14 days.  Start taking on: March 30, 2023            CHANGE how you take these medications      amLODIPine 10 MG tablet  Commonly known as: NORVASC  Take 1 tablet (10 mg total) by mouth once daily.  Start taking on: March 31, 2023  What changed:   medication strength  how much to take            CONTINUE taking these medications      blood pressure monitor Kit  1 each by Misc.(Non-Drug; Combo Route) route 2 (two) times a day.            STOP taking these medications      traMADoL 50 mg tablet  Commonly known as: ULTRAM               Where to Get Your Medications        These medications were sent to [facility] - [address] [address]      Phone: [phone]   amLODIPine 10 MG tablet  ergocalciferol 50,000 unit Cap  ferrous sulfate 325 (65 FE) MG EC tablet  folic acid 1 MG tablet  meloxicam 15 MG tablet  methotrexate 15 MG tablet  pantoprazole 40 MG tablet  predniSONE 10 MG tablet       Discharge Information:     - Keep follow up with rheumatology on 4/27/23  - Follow up in post wards clinic with internal medicine, clinic staff will call to schedule appointment  - Methotrexate 15 mg  weekly, folic acid 1 mg qd, meloxicam 15 mg qd, and Protonix 40 mg qd  - Prednisone 20 mg for 14 days followed by 10 mg for 14 days; defer further prednisone taper to rheumatology clinic at follow-up  - Ergocalciferol 50,000 units weekly for the next 7 weeks    Elgin Trujillo MD  [facility] - PGY-1

## 2023-03-30 NOTE — PT/OT/SLP DISCHARGE
Occupational Therapy Discharge Summary    Nela Shaver  MRN: [id number]   Principal Problem: Rheumatoid arthritis flare    Patient Active Problem List   Diagnosis    Polyarthralgia    Elevated C-reactive protein (CRP)    Elevated sed rate    Adult BMI <19 kg/sq m    Rapidly progressive weakness    Bilateral knee effusions    Primary hypertension    Rheumatoid arthritis flare         Patient Discharged from acute Occupational Therapy.  Please refer to prior OT note for functional status.    Assessment:      Patient has not met goals.    Objective:     GOALS:   Multidisciplinary Problems       Occupational Therapy Goals       Not on file              Multidisciplinary Problems (Resolved)          Problem: Occupational Therapy    Goal Priority Disciplines Outcome Interventions   Occupational Therapy Goal   (Resolved)     OT, PT/OT Goals not met.    Description: Goals to be met by: d/c     Patient will increase functional independence with ADLs by performin. UE Dressing with Stand-by Assistanc from EOB.-met 3/30/23  2. LE Dressing with Minimal Assistance from EOB -met 3/30/23.  3. Grooming while standing at sink with Contact Guard Assistance.-met 3/30/23 with SBA and RW  4. Toileting from bedside commode with Minimal Assistance for hygiene and clothing management.-met 3/30/23   5. Toilet transfer to bedside commode with Contact Guard Assistance with RW-met 3/30/23    3/30/23 5/5 goals met; revised goals  1. SBA LB dressing from EOB  2. SBA toileting routine from toilet .   3. SBA stand pivot transfer to toilet with grab bars                            Reasons for Discontinuation of Therapy Services  Transfer to alternate level of care.      Plan:     Patient Discharged to: Home with Home Health Service    3/30/2023

## 2023-03-30 NOTE — PT/OT/SLP PROGRESS
Occupational Therapy   Treatment    Name: Nela Shaver  MRN: [id number]  Admitting Diagnosis:  Rheumatoid arthritis flare     Patient Active Problem List   Diagnosis    Polyarthralgia    Elevated C-reactive protein (CRP)    Elevated sed rate    Adult BMI <19 kg/sq m    Rapidly progressive weakness    Bilateral knee effusions    Primary hypertension    Rheumatoid arthritis flare        Recommendations:     Discharge Recommendations: home with home health, rehabilitation facility  Discharge Equipment Recommendations:  walker, rolling, bath bench, bedside commode  DME to be obtained: none  Barriers to discharge:  None    Assessment:     Nela Shaver is a 47 y.o. female with a medical diagnosis of Rheumatoid arthritis flare.  She presents with functional decline. Performance deficits affecting function are weakness, impaired endurance, impaired self care skills, impaired functional mobility, gait instability, impaired balance, decreased upper extremity function, decreased lower extremity function, pain, impaired coordination, edema.     Pt in great spirits this am. Pt reporting no pain today and feeling stronger . Pt met all goals set and goals revised . Improved use of L UE and hand as assist in self care tasks     Rehab Prognosis:  Good; patient would benefit from acute skilled OT services to address these deficits and reach maximum level of function.       Plan:     Patient to be seen  (Mon-Fri 2-5 times per week) to address the above listed problems via self-care/home management, therapeutic activities, therapeutic exercises  Plan of Care Expires:  (d/c)  Plan of Care Reviewed with: patient    Subjective     Chief Complaint: none stated   Patient/Family Comments/goals: return to [facility] with her friend and her son to assist as needed. Reported that friend has all necessary DME for pt to use- TTB, grab bars, RW, BSC and w/c as needed    Pain/Comfort:  Pain Rating 1: 0/10  Pain Rating Post-Intervention 1:  0/10    Objective:     Communicated with: Nurse Soto prior to session.  Patient found HOB elevated with peripheral IV upon OT entry to room.    General Precautions: Standard, fall    Orthopedic Precautions:N/A  Braces: N/A  Respiratory Status: Room air     Occupational Performance:     Bed Mobility:    Patient completed Supine to Sit with modified independence     Functional Mobility/Transfers:  Patient completed Sit <> Stand Transfer with contact guard assistance  with  rolling walker   Patient completed Bed <> Chair  and BSC Transfer using Stand Pivot technique with contact guard assistance with rolling walker  Functional Mobility: CGA with RW ambulate 60 feet with guarded and slow mobility but much improved from 3/29/23;     Activities of Daily Living:  Grooming: stand by assistance standing at sink fwith RW for oral care  and washing hands and face   Lower Body Dressing: contact guard assistance standing phase for donning underwear; s/u socks   Toileting: contact guard assistance standing phase for clothing mgt and SBA hygiene seated on BSC         Treatment & Education:  While seated EOB, pt educated in UE HEP with yellow theraband and issued written instruction. Pt with good understanding and able to demonstrate UE ex.     Patient left up in chair with all lines intact, call button in reach, nurse  notified, and friend  present    GOALS:   Multidisciplinary Problems       Occupational Therapy Goals          Problem: Occupational Therapy    Goal Priority Disciplines Outcome Interventions   Occupational Therapy Goal     OT, PT/OT Ongoing, Progressing    Description: Goals to be met by: d/c     Patient will increase functional independence with ADLs by performin. UE Dressing with Stand-by Assistanc from EOB.-met 3/30/23  2. LE Dressing with Minimal Assistance from EOB -met 3/30/23.  3. Grooming while standing at sink with Contact Guard Assistance.-met 3/30/23 with SBA and RW  4. Toileting from bedside  commode with Minimal Assistance for hygiene and clothing management.-met 3/30/23   5. Toilet transfer to bedside commode with Contact Guard Assistance with RW-met 3/30/23    3/30/23 5/5 goals met; revised goals  1. SBA LB dressing from EOB  2. SBA toileting routine from toilet .   3. SBA stand pivot transfer to toilet with grab bars                            Time Tracking:     OT Date of Treatment: 03/30/23  OT Start Time: 0747  OT Stop Time: 0830  OT Total Time (min): 43 min    Billable Minutes:Self Care/Home Management 13 min  Therapeutic Exercise 12 min   Total Time 43 min as PT A x 18 min    OT/MAYKEL: OT          3/30/2023

## 2023-03-30 NOTE — PLAN OF CARE
03/29/23 1211   Discharge Assessment   Assessment Type Discharge Planning Assessment   Confirmed/corrected address, phone number and insurance Yes   Confirmed Demographics Correct on Facesheet   Source of Information patient   When was your last doctors appointment?   (Yadira Myers)   Reason For Admission weakness, HTN, thrombocytosis   People in Home alone   Facility Arrived From: home   Do you expect to return to your current living situation? Yes   Do you have help at home or someone to help you manage your care at home? No   Prior to hospitilization cognitive status: Alert/Oriented   Current cognitive status: Alert/Oriented   Equipment Currently Used at Home none   Readmission within 30 days? No   Patient currently being followed by outpatient case management? No   Do you currently have service(s) that help you manage your care at home? No   Do you take prescription medications? No   Do you have prescription coverage? No   Coverage Medicaid pending   Do you have any problems affording any of your prescribed medications? No   Who is going to help you get home at discharge? Not sure   How do you get to doctors appointments? family or friend will provide   Are you on dialysis? No   Do you take coumadin? No   Discharge Plan A Home   DME Needed Upon Discharge    (Pending PT/OT eval)   Discharge Plan discussed with: Patient   Discharge Barriers Identified No family/friends to help;Unisured   Physical Activity   On average, how many days per week do you engage in moderate to strenuous exercise (like a brisk walk)? 0 days   On average, how many minutes do you engage in exercise at this level? 0 min   Financial Resource Strain   How hard is it for you to pay for the very basics like food, housing, medical care, and heating? Not hard   Housing Stability   In the last 12 months, was there a time when you were not able to pay the mortgage or rent on time? N   In the last 12 months, how many places have you lived? 1   In  the last 12 months, was there a time when you did not have a steady place to sleep or slept in a shelter (including now)? N   Transportation Needs   In the past 12 months, has lack of transportation kept you from medical appointments or from getting medications? no   In the past 12 months, has lack of transportation kept you from meetings, work, or from getting things needed for daily living? No   Food Insecurity   Within the past 12 months, you worried that your food would run out before you got the money to buy more. Never true   Within the past 12 months, the food you bought just didn't last and you didn't have money to get more. Never true   Stress   Do you feel stress - tense, restless, nervous, or anxious, or unable to sleep at night because your mind is troubled all the time - these days? Not at all   Social Connections   In a typical week, how many times do you talk on the phone with family, friends, or neighbors? Twice a week   How often do you get together with friends or relatives? Twice   How often do you attend Scientology or Catholic services? Never   Do you belong to any clubs or organizations such as Scientology groups, unions, fraternal or athletic groups, or school groups? No   How often do you attend meetings of the clubs or organizations you belong to? Never   Are you , , , , never , or living with a partner? Never marrie   Alcohol Use   Q1: How often do you have a drink containing alcohol? Never   Q2: How many drinks containing alcohol do you have on a typical day when you are drinking? None   Q3: How often do you have six or more drinks on one occasion? Never

## 2023-03-30 NOTE — PLAN OF CARE
Problem: Adult Inpatient Plan of Care  Goal: Plan of Care Review  3/30/2023 0927 by Brittany Almeida RN  Outcome: Met  3/30/2023 0925 by Brittany Almeida RN  Outcome: Ongoing, Progressing  Goal: Patient-Specific Goal (Individualized)  3/30/2023 0927 by Brittany Almeida RN  Outcome: Met  3/30/2023 0925 by Brittany Almeida RN  Outcome: Ongoing, Progressing  Goal: Absence of Hospital-Acquired Illness or Injury  3/30/2023 0927 by Brittany Almeida RN  Outcome: Met  3/30/2023 0925 by Brittany Almeida RN  Outcome: Ongoing, Progressing  Goal: Optimal Comfort and Wellbeing  3/30/2023 0927 by Brittany Almeida RN  Outcome: Met  3/30/2023 0925 by Brittany Almeida RN  Outcome: Ongoing, Progressing  Goal: Readiness for Transition of Care  3/30/2023 0927 by Brittany Almeida RN  Outcome: Met  3/30/2023 0925 by Brittany Almeida RN  Outcome: Ongoing, Progressing     Problem: Skin Injury Risk Increased  Goal: Skin Health and Integrity  3/30/2023 0927 by Brittany Almeida RN  Outcome: Met  3/30/2023 0925 by Brittany Almeida RN  Outcome: Ongoing, Progressing     Problem: Pain Acute  Goal: Acceptable Pain Control and Functional Ability  3/30/2023 0927 by Brittany Almeida RN  Outcome: Met  3/30/2023 0925 by Brittany Almeida RN  Outcome: Ongoing, Progressing     Problem: Fall Injury Risk  Goal: Absence of Fall and Fall-Related Injury  3/30/2023 0927 by Brittany Almeida RN  Outcome: Met  3/30/2023 0925 by Brittany lAmeida RN  Outcome: Ongoing, Progressing     Problem: Physical Therapy  Goal: Physical Therapy Goal  Description: Goals to be met by: Discharge     Patient will increase functional independence with mobility by performing:    -. Supine to sit with Stand-by Assistance  -. Sit to supine with Stand-by Assistance  -. Sit to stand transfer with Contact Guard Assistance  -. Bed to chair transfer with Contact Guard Assistance using Rolling Walker  -. Gait  x 90 feet with Contact Guard Assistance using Rolling Walker.      Outcome: Met     Problem: Occupational Therapy  Goal: Occupational Therapy Goal  Description: Goals to be met by: d/c     Patient will increase functional independence with ADLs by performin. UE Dressing with Stand-by Assistanc from EOB.-met 3/30/23  2. LE Dressing with Minimal Assistance from EOB -met 3/30/23.  3. Grooming while standing at sink with Contact Guard Assistance.-met 3/30/23 with SBA and RW  4. Toileting from bedside commode with Minimal Assistance for hygiene and clothing management.-met 3/30/23   5. Toilet transfer to bedside commode with Contact Guard Assistance with RW-met 3/30/23    3/30/23 5/5 goals met; revised goals  1. SBA LB dressing from EOB  2. SBA toileting routine from toilet .   3. SBA stand pivot transfer to toilet with grab bars       Outcome: Met

## 2023-03-30 NOTE — PLAN OF CARE
Problem: Adult Inpatient Plan of Care  Goal: Plan of Care Review  Outcome: Ongoing, Progressing  Goal: Patient-Specific Goal (Individualized)  Outcome: Ongoing, Progressing  Goal: Absence of Hospital-Acquired Illness or Injury  Outcome: Ongoing, Progressing  Goal: Optimal Comfort and Wellbeing  Outcome: Ongoing, Progressing  Goal: Readiness for Transition of Care  Outcome: Ongoing, Progressing     Problem: Skin Injury Risk Increased  Goal: Skin Health and Integrity  Outcome: Ongoing, Progressing     Problem: Pain Acute  Goal: Acceptable Pain Control and Functional Ability  Outcome: Ongoing, Progressing     Problem: Fall Injury Risk  Goal: Absence of Fall and Fall-Related Injury  Outcome: Ongoing, Progressing

## 2023-03-30 NOTE — PT/OT/SLP DISCHARGE
Physical Therapy Discharge Summary    Name: Nela Shaver  MRN: [id number]   Principal Problem: Rheumatoid arthritis flare     Patient Discharged from acute Physical Therapy on 3/30/23.  Please refer to prior PT noted date on 3/30/23 for functional status.     Assessment:     Patient appropriate for care in another setting.    Objective:     GOALS:   Multidisciplinary Problems       Physical Therapy Goals       Not on file              Multidisciplinary Problems (Resolved)          Problem: Physical Therapy    Goal Priority Disciplines Outcome Goal Variances Interventions   Physical Therapy Goal   (Resolved)     PT, PT/OT Met     Description: Goals to be met by: Discharge     Patient will increase functional independence with mobility by performing:    -. Supine to sit with Stand-by Assistance  -. Sit to supine with Stand-by Assistance  -. Sit to stand transfer with Contact Guard Assistance  -. Bed to chair transfer with Contact Guard Assistance using Rolling Walker  -. Gait  x 90 feet with Contact Guard Assistance using Rolling Walker.                          Reasons for Discontinuation of Therapy Services  Transfer to alternate level of care.      Plan:     Patient Discharged to: Home with Home Health Service.      3/30/2023

## 2023-03-31 ENCOUNTER — PATIENT OUTREACH (OUTPATIENT)
Dept: ADMINISTRATIVE | Facility: CLINIC | Age: 48
End: 2023-03-31

## 2023-03-31 NOTE — PROGRESS NOTES
C3 nurse spoke with Nela Shaver for a TCC post hospital discharge follow up call. The patient has a scheduled [facility] appointment with BOY Reaves [facility] - Internal medicine on 4/10/23 @ 12:45.

## 2023-04-10 ENCOUNTER — OFFICE VISIT (OUTPATIENT)
Dept: INTERNAL MEDICINE | Facility: CLINIC | Age: 48
End: 2023-04-10

## 2023-04-10 VITALS
SYSTOLIC BLOOD PRESSURE: 164 MMHG | HEIGHT: 64 IN | RESPIRATION RATE: 16 BRPM | DIASTOLIC BLOOD PRESSURE: 97 MMHG | HEART RATE: 64 BPM | BODY MASS INDEX: 21.71 KG/M2 | TEMPERATURE: 98 F | WEIGHT: 127.19 LBS

## 2023-04-10 DIAGNOSIS — M06.9 RHEUMATOID ARTHRITIS FLARE: ICD-10-CM

## 2023-04-10 PROCEDURE — 99215 OFFICE O/P EST HI 40 MIN: CPT | Mod: PBBFAC | Performed by: STUDENT IN AN ORGANIZED HEALTH CARE EDUCATION/TRAINING PROGRAM

## 2023-04-10 NOTE — PROGRESS NOTES
"[facility] INTERNAL MEDICINE  OUTPATIENT OFFICE VISIT NOTE    SUBJECTIVE:      HPI: Nela Shaver is a 47 y.o. yo female w/ PMH of RA, Vit D deficiency, and HTN, who presents today for post wards IM follow up. Patient is anxious today and has many questions about her medications. She has been compliant with her medications and states her bilateral hand, knee, and wrist pain is improved. Patient has follow up with Rheumatology at the end of this month. Patient encouraged to follow up with her PCP. She has no further questions or concerns today.       ROS:  (+) bilateral hand and knee pain  (-) Chest pain, palpitations, SOB, fever, night sweats, chills, diarrhea, constipation.       OBJECTIVE:     Vital signs:   BP (!) 164/97 (BP Location: Left arm, Patient Position: Sitting, BP Method: Medium (Automatic))   Pulse 64   Temp 98 °F (36.7 °C) (Oral)   Resp 16   Ht 5' 4" (1.626 m)   Wt 57.7 kg (127 lb 3.2 oz)   LMP 02/28/2023 (Approximate)   BMI 21.83 kg/m²      Physical Examination:  General: Well nourished w/o distress  HEENT: NC/AT; PERRLA; nasal and oral mucosa moist and clear; no sinus tenderness; no thyromegaly  Neck: Full ROM; no lymphadenopathy  Pulm: CTA bilaterally, normal work of breathing  CV: S1, S2 w/o murmurs or gallops; no edema noted  GI: Soft with normal bowel sounds in all quadrants, no masses on palpation  MSK: bony nodules on hands and wrist, mild bilateral edema of the knees   Derm: No rashes, abnormal bruising, or skin lesions  Neuro: AAOx4; CN II-XII intact; motor/sensory function intact  Psych: Cooperative; appropriate mood and affect       ASSESSMENT & PLAN:     RA bilateral Knees  -Methotrexate 15 mg weekly, folic acid 1 mg qd, meloxicam 15 mg qd, and Protonix 40 mg qd  -Keep follow up with rheumatology on 4/27/23  -Prednisone 20 mg for 14 days followed by 10 mg for 14 days; defer further prednisone taper to rheumatology clinic at follow-up    Vit D Deficiency   -Ergocalciferol 50,000 units " weekly for total of 7 weeks    HTN  -/97mmHg today  -Continue on Amlodipine 10mg daily  -Follow up with PCP in 1 week for BP check        Follow up with PCP.    Abel Osman DO   [facility] Internal Medicine, PGY-3

## 2023-04-11 ENCOUNTER — TELEPHONE (OUTPATIENT)
Dept: ADMINISTRATIVE | Facility: HOSPITAL | Age: 48
End: 2023-04-11

## 2023-04-24 PROBLEM — Z79.899 HIGH RISK MEDICATION USE: Status: ACTIVE | Noted: 2023-04-24

## 2023-04-24 PROBLEM — M05.79 RHEUMATOID ARTHRITIS INVOLVING MULTIPLE SITES WITH POSITIVE RHEUMATOID FACTOR: Status: ACTIVE | Noted: 2023-04-24

## 2023-04-24 PROBLEM — D64.9 ANEMIA: Status: ACTIVE | Noted: 2023-04-24

## 2023-04-24 NOTE — PROGRESS NOTES
"  Patient ID: [id number]     Chief Complaint: Rheumatoid Arthritis (Pt stated dustin knee pain and hands stated pains are better since taking prednisone)      HPI:     Nela Shaver is a 47 y.o. female here today for a follow up newly dx RA visit- hospital f/u.    The patient is a 47-year-old female who presents today for initial RA/hospital follow up.  She presented to the emergency room March 28, 2023 for complaints of ongoing generalized joint pain, erythema, warmth & swelling. Patient reports symptoms initially began around November of 2022 & have progressively gotten worse since this time. Reports pain is all over, but is worse in neck, back & knees. Seen in this ED 3/7/2023 & had significant elevation of inflammatory markers & knee XRs which showed effusion. Patient was discharged w/ meds for symptom relief & referred to clinic for further evaluation. In clinic, lab workup drawn which showed ESR >130, , anti-CCP pos & RF pos. Patient reported severe weakness in b/l LEs & states she reached the point where she was unable to walk at all over 4 days and presented to the ER for evaluation. Reports that she had essentially been bedbound for 4 days prior to ER visit and was urinating in bed as she could not move due to pain, she denies any history of incontinence. Admits swelling in bilateral hands and knees.  Joint pain worse with activities.  Morning stiffness for more than 3 hours sometimes.     States history of Raynaud's for the last 4 years, symptoms occur during winter, episodes happen 3 to 4 times a week during winter.  Does not have color changes all the time.  Never had ulcers in the fingertips or toes. Reports officially diagnosed January 2023.     Today she presents for follow up after hospital discharge. She has been taking MTX and tolerating well. She does feel more fatigue lately and describes as being "foggy". She states she is resting well at night time and waking up feeling rested. She denies any " recent illness and no further hospitalizations. She denies any red/warm/swollen joints at this time. Morning stiffness has subsided. History of Raynaud's as mentioned above, she states warming her hands helps symptoms resolve, no issues with feet, she continues to deny any ulcers on fingers.      Denies history of fevers, rashes, photosensitivity, oral or nasal ulcers, h/o MI, stroke, seizures, h/o PE or DVT,  uveitis, malignancies.   Family history of autoimmune disease: none.   Pregnancies:  1 Miscarriages: none.   Smoking: 3/4 pack a week- not a daily smoker- start age 16 y/o.     Social History     Tobacco Use   Smoking Status Some Days    Packs/day: 0.50    Types: Cigarettes   Smokeless Tobacco Never        ----------------------------  Arthritis  Hypertension     Past Surgical History:   Procedure Laterality Date    DILATION AND CURETTAGE OF UTERUS N/A 06/01/1995       Review of patient's allergies indicates:  No Known Allergies    Current Outpatient Medications   Medication Instructions    amLODIPine (NORVASC) 10 mg, Oral, Daily    blood pressure monitor Kit 1 each, Misc.(Non-Drug; Combo Route), 2 times daily    ergocalciferol (ERGOCALCIFEROL) 50,000 Units, Oral, Every 7 days    ferrous sulfate 325 mg, Oral, Daily    folic acid (FOLVITE) 1 mg, Oral, Daily    meloxicam (MOBIC) 15 mg, Oral, Daily    methotrexate (TREXALL) 15 mg, Oral, Every 7 days, Hold for fever or infections.    pantoprazole (PROTONIX) 40 mg, Oral, Daily    predniSONE (DELTASONE) 5 MG tablet Take 1 tab (5 mg) po qd x 14 days then qod x 1 week.       Social History     Socioeconomic History    Marital status: Single   Tobacco Use    Smoking status: Some Days     Packs/day: 0.50     Types: Cigarettes    Smokeless tobacco: Never   Substance and Sexual Activity    Alcohol use: Not Currently    Drug use: Never    Sexual activity: Not Currently     Social Determinants of Health     Financial Resource Strain: Low Risk     Difficulty of Paying Living  Expenses: Not hard at all   Food Insecurity: No Food Insecurity    Worried About Running Out of Food in the Last Year: Never true    Ran Out of Food in the Last Year: Never true   Transportation Needs: No Transportation Needs    Lack of Transportation (Medical): No    Lack of Transportation (Non-Medical): No   Physical Activity: Inactive    Days of Exercise per Week: 0 days    Minutes of Exercise per Session: 0 min   Stress: No Stress Concern Present    Feeling of Stress : Not at all   Social Connections: Socially Isolated    Frequency of Communication with Friends and Family: Twice a week    Frequency of Social Gatherings with Friends and Family: Twice a week    Attends Anab[employer]ist Services: Never    Active Member of Clubs or Organizations: No    Attends Club or Organization Meetings: Never    Marital Status: Never    Housing Stability: Low Risk     Unable to Pay for Housing in the Last Year: No    Number of Places Lived in the Last Year: 1    Unstable Housing in the Last Year: No        Family History   Problem Relation Age of Onset    Hyperlipidemia Mother           There is no immunization history on file for this patient.    Patient Care Team:  BOY Reaves as PCP - General (Family Medicine)  Tamela Delgadillo LPN as Care Coordinator     Subjective:     ROS    Constitutional:  Denies chills. Denies fever. Denies night sweats. Denies weight loss.   Ophthalmology: Denies blurred vision. Denies dry eyes. Denies eye pain. Denies Itching and redness.   ENT: Denies oral ulcers. Denies epistaxis. Denies dry mouth. Denies swollen glands.   Endocrine: Denies diabetes. Denies thyroid Problems.   Respiratory: Denies cough. Denies shortness of breath. Denies shortness of breath with exertion. Denies hemoptysis.   Cardiovascular: Denies chest pain at rest. Denies chest pain with exertion. Denies palpitations.    Gastrointestinal: Denies abdominal pain. Denies diarrhea. Denies nausea. Denies vomiting. Denies  "hematemesis or hematochezia. Denies heartburn.  Genitourinary: Denies blood in urine.  Musculoskeletal: See HPI for details  Integumentary: Denies rash. Denies photosensitivity.   Peripheral Vascular: Denies Ulcers of hands and/or feet. Denies Cold extremities.   Neurologic: Denies dizziness. Denies headache.  Denies loss of strength. Denies numbness or tingling.   Psychiatric: Denies depression. Admits anxiety- stable, states was overwhelmed with getting back to work after hospital stay but is doing much better. Denies suicidal/homicidal ideations.      Objective:     Visit Vitals  BP (!) 138/100 (BP Location: Right arm, Patient Position: Sitting, BP Method: Medium (Manual))   Pulse 104   Temp 98.4 °F (36.9 °C) (Oral)   Resp 20   Ht 5' 4" (1.626 m)   Wt 56.2 kg (124 lb)   LMP 02/28/2023 (Approximate)   SpO2 100%   BMI 21.28 kg/m²       Physical Exam    General Appearance: alert, pleasant, in no acute distress.  Skin: Skin color, texture, turgor normal. No rashes or lesions.  Eyes:  extraocular movement intact (EOMI), pupils equal, round, reactive to light and accommodation, conjunctiva clear.  ENT: No oral or nasal ulcers.  Neck:  Neck supple. No adenopathy.   Lungs: CTA bilaterally without crackles, rhonchi, or wheezes.   Heart: RRR w/o murmurs.  No edema. 2+ DP pulse.  Neuro: Alert, oriented, CN II-XII GI, sensory and motor innervation intact.  Musculoskeletal: No activate synovitis noted to bilateral MCPs, synovial thickening noted to 2nd/3rd MCPs bilaterally, FROM noted to bialteraly wrists, elbows and shoulders with no pain, no pain or swelling noted to bilateral knees, +crepitus bilaterally, no pain with ROM, no pain to bilateral ankles, MTPs, FROM with no pain.   Psych: Alert, oriented, normal eye contact.       Labs Reviewed:     3/28/2023: Sed Rate 73 (<20).  (<5). CPK (low 19) and Aldolase ok. TSH 0.829. Vitamin D 7.1. A1C 5.2. PTH ok. MAGED negative. c-ANCA, p-ANCA negative. C3/C4 ok. CCP Positive. " RF IgA >100, IgG 45h, IgM >100. Centromere negative. Quantiferon Indeterminate. HIV nonreactive. Hep B Core IgM Reactive. Hep B Core Ab, Surface Ab and Antigen nonreactive. UA trace protein no blood noted. Lyme serologies negative.  Parvovirus antibody negative.  Uric acid and synovial fluid 3.5.     03/29/2023: CBC hemoglobin 8.1.  Elevated platelet count 821.  CMP okay.  Glucose elevated to 131.  CPK low 19.  .      Rheumatology:  Lab Results   Component Value Date    ANAHS <1:80 (Negative) 03/28/2023    C3 136 03/24/2023    C4 23.7 03/24/2023    CCPANTIBODIE Positive (A) 03/24/2023    SEDRATE 73 (H) 03/28/2023    LABURIC 4.2 03/24/2023        Chemistry:  Lab Results   Component Value Date     03/30/2023    K 4.0 03/30/2023    CHLORIDE 104 03/30/2023    BUN 28.5 (H) 03/30/2023    CREATININE 0.80 03/30/2023    EGFRNORACEVR >60 03/30/2023    GLUCOSE 131 (H) 03/30/2023    CALCIUM 10.5 (H) 03/30/2023    ALKPHOS 101 03/30/2023    LABPROT 8.7 (H) 03/30/2023    ALBUMIN 2.4 (L) 03/30/2023    AST 33 03/30/2023    ALT 23 03/30/2023    MG 2.20 03/30/2023    PHOS 2.6 03/30/2023    [id number] 7.1 (L) 03/24/2023        Hematology:  Lab Results   Component Value Date    WBC 11.0 03/30/2023    HGB 9.1 (L) 03/30/2023    HCT 31.7 (L) 03/30/2023     (H) 03/30/2023        Urine:  Lab Results   Component Value Date    COLORUA Light-Yellow 03/28/2023    APPEARANCEUA Clear 03/28/2023    SGUA 1.012 03/28/2023    PHUA 7.5 03/28/2023    PROTEINUA Trace (A) 03/28/2023    GLUCOSEUA Normal 03/28/2023    KETONESUA 1+ (A) 03/28/2023    BLOODUA Negative 03/28/2023    NITRITESUA Negative 03/28/2023    LEUKOCYTESUR 25 (A) 03/28/2023    RBCUA None Seen 03/28/2023    WBCUA 0-5 03/28/2023    BACTERIA Trace (A) 03/28/2023    SQEPUA Occ (A) 03/28/2023    HYALINECASTS None Seen 03/28/2023    CREATRANDUR 360.0 (H) 03/24/2023        Lab Results   Component Value Date    CREATRANDUR 360.0 (H) 03/24/2023        Imaging:   3/2023 XR Abd  "AP : Impression: No acute abdominal radiographic abnormality.   CXRay:  Impression: No acute abnormality of the chest.  Right Foot Xray: Impression: Minimal degenerative changes. Hallux valgus deformity.  Left Foot Xray: Impression: No acute osseous abnormality.  Left Hand Xray: Impression: No acute fracture.  Mild degenerative changes of the ulna styloid and distal 5th interphalangeal joint.  Right Hand Xray: Impression: No acute osseous abnormality, fracture, or dislocation. There is no significant degenerative change.  Bilateral Knee Xray:  Impression: Mild degenerative changes of the knees with bilateral joint effusions.   Neck Soft Tissue Xray: Impression: No acute abnormality identified.    Assessment:        ICD-10-CM ICD-9-CM   1. Rheumatoid arthritis involving multiple sites with positive rheumatoid factor  M05.79 714.0   2. Iron deficiency anemia, unspecified iron deficiency anemia type  D50.9 280.9   3. High risk medication use  Z79.899 V58.69   4. Vitamin D deficiency  E55.9 268.9        Plan:     1. Rheumatoid arthritis involving multiple sites with positive rheumatoid factor  -Recent dx March 2023 after hospital admission. High titer RF and antiCCP.  Elevated inflammatory markers.  Improvement of joint pain with steroids. Synovitis in left hand, bilateral knees, ankles and MTPs on exam in hospital. Started on MTX prior to discharge. Today on follow up, she is tolerating MTX 15 mg po qweek with folic acid 1 mg po qd well without any s/e at this time. She continues to wean Prednisone dosing and is currently at 10 mg daily and has 2 days left of this dose- overall she reports feeling "MUCH" better, denies joint pain at this time. Today on exam, no active synovitis noted.   -Continue MTX 15 mg po qweek with folic acid 1 mg po qd  -Continue to wean Prednisone- she will finish 10 mg x 2 days then decrease dose to 5 mg po qd x 14 days then take qod x 1 week then ok to stop.   -Referral re-sent to GYN for " contraception management- again, at length discussion in regards to MTX and pregnancy- she voiced understanding and denies any sexual activity, UPT today  -LMP-3/2023 (end of February and again at the beginning of March)- reports had 2 cycles- not sexually active. Last cycle right at time of flare- reports history of heavy menses.     2. Anemia, PHAM  -May correlate with heavy menses- continue Iron supplements- has been taking qod, enc to try to take daily if tolerated. Absorbed well with vitamin C, discussed GI s/e and dark stools while taking.   -Repeat CBC today     3. High risk medication use  -Persons with rheumatoid arthritis, lupus, psoriatic arthritis and other autoimmune diseases are at increased risk of cardiovascular disease including heart attack and stroke. We recommend that all patients with these conditions have annual health maintenance exams including lipid measurements, blood pressure measurements, and smoking cessation counseling when applicable at their primary care provider's office.  -Advised to stay up-to-date on age appropriate vaccinations and malignancy screening with PCP. She has not had a mammogram/pap in several years- referral made to GYN to get established as well as to start on birth control with MTX- discussed Depo, Implanon, or IUD- advised to avoid OCPs  -Continued lab monitoring.     4. HTN  -Currently not at goal, she states she did take her medications. At length discussion in regards to taking meds daily, cont to monitor b/p at home- parameters reviewed (goal <140/90)- if remains elevated f/u with PCP- ER precautions advised     5. Vitamin D Def  -Repeat Vitamin D  -Continue current Rx for Ergocalciferol 50,000 IU po qweek x 3 months as directed.     Follow up in about 4 weeks (around 5/25/2023) for NP Follow Up. In addition to their scheduled follow up, the patient has also been instructed to follow up on as needed basis.     Total time spent with patient and documentation is  more than 60 minutes. All questions were answered to patient's satisfaction and patient verbalized understanding.       Orders Placed This Encounter   Procedures    Hepatitis B DNA, Quant     Standing Status:   Future     Standing Expiration Date:   6/25/2024    CBC Auto Differential     Standing Status:   Future     Standing Expiration Date:   6/25/2024    Comprehensive Metabolic Panel     Standing Status:   Future     Standing Expiration Date:   6/25/2024    C-Reactive Protein     Standing Status:   Future     Standing Expiration Date:   6/25/2024    Vitamin D     Standing Status:   Future     Standing Expiration Date:   6/25/2024    Sedimentation rate     Standing Status:   Future     Standing Expiration Date:   6/25/2024    Ambulatory referral/consult to Obstetrics / Gynecology     Standing Status:   Future     Standing Expiration Date:   5/27/2024     Referral Priority:   Routine     Referral Type:   Consultation     Referral Reason:   Specialty Services Required     Requested Specialty:   Obstetrics and Gynecology     Number of Visits Requested:   1    POCT urine pregnancy        BOY Ross

## 2023-04-27 ENCOUNTER — LAB VISIT (OUTPATIENT)
Dept: LAB | Facility: HOSPITAL | Age: 48
End: 2023-04-27
Attending: NURSE PRACTITIONER

## 2023-04-27 ENCOUNTER — OFFICE VISIT (OUTPATIENT)
Dept: RHEUMATOLOGY | Facility: CLINIC | Age: 48
End: 2023-04-27

## 2023-04-27 VITALS
RESPIRATION RATE: 20 BRPM | HEART RATE: 104 BPM | OXYGEN SATURATION: 100 % | DIASTOLIC BLOOD PRESSURE: 100 MMHG | HEIGHT: 64 IN | TEMPERATURE: 98 F | BODY MASS INDEX: 21.17 KG/M2 | WEIGHT: 124 LBS | SYSTOLIC BLOOD PRESSURE: 150 MMHG

## 2023-04-27 DIAGNOSIS — M05.79 RHEUMATOID ARTHRITIS INVOLVING MULTIPLE SITES WITH POSITIVE RHEUMATOID FACTOR: Primary | ICD-10-CM

## 2023-04-27 DIAGNOSIS — M05.79 RHEUMATOID ARTHRITIS INVOLVING MULTIPLE SITES WITH POSITIVE RHEUMATOID FACTOR: ICD-10-CM

## 2023-04-27 DIAGNOSIS — I10 PRIMARY HYPERTENSION: ICD-10-CM

## 2023-04-27 DIAGNOSIS — Z79.899 HIGH RISK MEDICATION USE: ICD-10-CM

## 2023-04-27 DIAGNOSIS — D50.9 IRON DEFICIENCY ANEMIA, UNSPECIFIED IRON DEFICIENCY ANEMIA TYPE: ICD-10-CM

## 2023-04-27 DIAGNOSIS — E55.9 VITAMIN D DEFICIENCY: ICD-10-CM

## 2023-04-27 LAB
ALBUMIN SERPL-MCNC: 3.9 G/DL (ref 3.5–5)
ALBUMIN/GLOB SERPL: 1 RATIO (ref 1.1–2)
ALP SERPL-CCNC: 68 UNIT/L (ref 40–150)
ALT SERPL-CCNC: 8 UNIT/L (ref 0–55)
AST SERPL-CCNC: 10 UNIT/L (ref 5–34)
B-HCG SERPL QL: NEGATIVE
BASOPHILS # BLD AUTO: 0.02 X10(3)/MCL (ref 0–0.2)
BASOPHILS NFR BLD AUTO: 0.3 %
BILIRUBIN DIRECT+TOT PNL SERPL-MCNC: 0.3 MG/DL
BUN SERPL-MCNC: 14.6 MG/DL (ref 7–18.7)
CALCIUM SERPL-MCNC: 10.3 MG/DL (ref 8.4–10.2)
CHLORIDE SERPL-SCNC: 108 MMOL/L (ref 98–107)
CO2 SERPL-SCNC: 26 MMOL/L (ref 22–29)
CREAT SERPL-MCNC: 0.84 MG/DL (ref 0.55–1.02)
CRP SERPL-MCNC: 4.1 MG/L
DEPRECATED CALCIDIOL+CALCIFEROL SERPL-MC: 26.5 NG/ML (ref 30–80)
EOSINOPHIL # BLD AUTO: 0.13 X10(3)/MCL (ref 0–0.9)
EOSINOPHIL NFR BLD AUTO: 1.7 %
ERYTHROCYTE [DISTWIDTH] IN BLOOD BY AUTOMATED COUNT: 26.6 % (ref 11.5–17)
ERYTHROCYTE [SEDIMENTATION RATE] IN BLOOD: 25 MM/HR (ref 0–20)
GFR SERPLBLD CREATININE-BSD FMLA CKD-EPI: >60 MLS/MIN/1.73/M2
GLOBULIN SER-MCNC: 4 GM/DL (ref 2.4–3.5)
GLUCOSE SERPL-MCNC: 94 MG/DL (ref 74–100)
HCT VFR BLD AUTO: 36.1 % (ref 37–47)
HGB BLD-MCNC: 10.8 G/DL (ref 12–16)
IMM GRANULOCYTES # BLD AUTO: 0.02 X10(3)/MCL (ref 0–0.04)
IMM GRANULOCYTES NFR BLD AUTO: 0.3 %
LYMPHOCYTES # BLD AUTO: 1.81 X10(3)/MCL (ref 0.6–4.6)
LYMPHOCYTES NFR BLD AUTO: 23.1 %
MCH RBC QN AUTO: 23.4 PG (ref 27–31)
MCHC RBC AUTO-ENTMCNC: 29.9 G/DL (ref 33–36)
MCV RBC AUTO: 78.1 FL (ref 80–94)
MONOCYTES # BLD AUTO: 0.34 X10(3)/MCL (ref 0.1–1.3)
MONOCYTES NFR BLD AUTO: 4.3 %
NEUTROPHILS # BLD AUTO: 5.51 X10(3)/MCL (ref 2.1–9.2)
NEUTROPHILS NFR BLD AUTO: 70.3 %
NRBC BLD AUTO-RTO: 0 %
PLATELET # BLD AUTO: 439 X10(3)/MCL (ref 130–400)
PMV BLD AUTO: 8.8 FL (ref 7.4–10.4)
POTASSIUM SERPL-SCNC: 4.6 MMOL/L (ref 3.5–5.1)
PROT SERPL-MCNC: 7.9 GM/DL (ref 6.4–8.3)
RBC # BLD AUTO: 4.62 X10(6)/MCL (ref 4.2–5.4)
SODIUM SERPL-SCNC: 141 MMOL/L (ref 136–145)
WBC # SPEC AUTO: 7.8 X10(3)/MCL (ref 4.5–11.5)

## 2023-04-27 PROCEDURE — 36415 COLL VENOUS BLD VENIPUNCTURE: CPT

## 2023-04-27 PROCEDURE — 80053 COMPREHEN METABOLIC PANEL: CPT

## 2023-04-27 PROCEDURE — 85025 COMPLETE CBC W/AUTO DIFF WBC: CPT

## 2023-04-27 PROCEDURE — 86140 C-REACTIVE PROTEIN: CPT

## 2023-04-27 PROCEDURE — 87517 HEPATITIS B DNA QUANT: CPT | Mod: 90

## 2023-04-27 PROCEDURE — 85651 RBC SED RATE NONAUTOMATED: CPT

## 2023-04-27 PROCEDURE — 99215 PR OFFICE/OUTPT VISIT, EST, LEVL V, 40-54 MIN: ICD-10-PCS | Mod: S$PBB,,, | Performed by: NURSE PRACTITIONER

## 2023-04-27 PROCEDURE — 99215 OFFICE O/P EST HI 40 MIN: CPT | Mod: S$PBB,,, | Performed by: NURSE PRACTITIONER

## 2023-04-27 PROCEDURE — 99215 OFFICE O/P EST HI 40 MIN: CPT | Mod: PBBFAC | Performed by: NURSE PRACTITIONER

## 2023-04-27 PROCEDURE — 81025 URINE PREGNANCY TEST: CPT | Performed by: NURSE PRACTITIONER

## 2023-04-27 PROCEDURE — 82306 VITAMIN D 25 HYDROXY: CPT

## 2023-04-27 RX ORDER — FOLIC ACID 1 MG/1
1 TABLET ORAL DAILY
Qty: 30 TABLET | Refills: 5 | Status: SHIPPED | OUTPATIENT
Start: 2023-04-27 | End: 2023-05-25 | Stop reason: SDUPTHER

## 2023-04-27 RX ORDER — FERROUS SULFATE 325(65) MG
325 TABLET, DELAYED RELEASE (ENTERIC COATED) ORAL DAILY
Qty: 30 TABLET | Refills: 5 | Status: SHIPPED | OUTPATIENT
Start: 2023-04-27

## 2023-04-27 RX ORDER — ERGOCALCIFEROL 1.25 MG/1
50000 CAPSULE ORAL
Qty: 4 CAPSULE | Refills: 4 | Status: SHIPPED | OUTPATIENT
Start: 2023-04-27 | End: 2023-05-25 | Stop reason: SDUPTHER

## 2023-04-27 RX ORDER — PREDNISONE 5 MG/1
TABLET ORAL
Qty: 30 TABLET | Refills: 0 | Status: SHIPPED | OUTPATIENT
Start: 2023-04-27 | End: 2023-05-25 | Stop reason: ALTCHOICE

## 2023-04-27 RX ORDER — MELOXICAM 15 MG/1
15 TABLET ORAL DAILY
Qty: 30 TABLET | Refills: 2 | Status: SHIPPED | OUTPATIENT
Start: 2023-04-27 | End: 2023-05-25 | Stop reason: ALTCHOICE

## 2023-04-28 ENCOUNTER — TELEPHONE (OUTPATIENT)
Dept: RHEUMATOLOGY | Facility: CLINIC | Age: 48
End: 2023-04-28

## 2023-04-28 NOTE — TELEPHONE ENCOUNTER
Please reach out to Ms Horn and have her keep a blood pressure log- advise her to take her medication every am as directed- wait an hour and check her blood pressure and record on paper (blood pressure and pulse reading with date and times)- she should be seated with her feet flat and her arm at the level of her heart when she checks her pressure as reviewed- please have her call with blood pressure readings in 1 week- she may need adjustment to her medication regimen if her pressures remain elevated- please advise ER precautions (if pressure equal to >180/110 (systolic or diastolic)=requires prompt ER assessment!- we discussed risk of uncontrolled blood pressure at her visit). Please also make sure she has a f/u apt scheduled with her PCP. I know she was concerned as she did not qualify for assistance when she applied- I did find another number for financial assistance that she can reach out to if she wishes to see if this may be beneficial for her: [phone]. Lab work still pending at this time- will call once resulted to review.

## 2023-04-28 NOTE — TELEPHONE ENCOUNTER
Called and spoke to pt informed her to keep blood pressure log take medication as directed wait an hour and check her blood pressure and record on paper dated and time should be seated with feet flat and arm level of heart. Check for 1 week. May need adjustment ti her medication regiment if pressure remains elevated. ER precautions  great or equal 108/110 requires prompt ER assessment make sure you have or make with PCP. Financial assistance phone number given [phone]. Pt verbalized understanding

## 2023-04-29 LAB — HBV DNA SERPL NAA+PROBE-ACNC: NORMAL IU/ML

## 2023-05-01 ENCOUNTER — TELEPHONE (OUTPATIENT)
Dept: RHEUMATOLOGY | Facility: CLINIC | Age: 48
End: 2023-05-01

## 2023-05-01 NOTE — TELEPHONE ENCOUNTER
Spoke with the patient at length in regards to lab results, overall lab are clinically acceptable and returning near goal, Hep B DNA undetected- we will continue to monitor at future visits- she has been monitoring her blood pressure and will call in 1 week with readings, states overall has decreased ranging in the 140s/80s- she will continue to monitor and record readings as discussed.

## 2023-05-09 ENCOUNTER — TELEPHONE (OUTPATIENT)
Dept: RHEUMATOLOGY | Facility: CLINIC | Age: 48
End: 2023-05-09

## 2023-05-09 NOTE — TELEPHONE ENCOUNTER
Called pt no answer left voicemail message to call clinic. Called regarding getting blood pressure log data

## 2023-05-09 NOTE — TELEPHONE ENCOUNTER
Please reach out to Ms Kory for blood pressure readings to review, she was advised to monitor blood pressures x 1 week and call with readings

## 2023-05-10 NOTE — TELEPHONE ENCOUNTER
Called pt no answer left detailed message on voicemail. Called regarding obtain her data from blood pressure readings to give to Drea Gould NP

## 2023-05-11 ENCOUNTER — PATIENT MESSAGE (OUTPATIENT)
Dept: INTERNAL MEDICINE | Facility: CLINIC | Age: 48
End: 2023-05-11

## 2023-05-11 DIAGNOSIS — I10 PRIMARY HYPERTENSION: Primary | ICD-10-CM

## 2023-05-11 RX ORDER — LOSARTAN POTASSIUM 50 MG/1
50 TABLET ORAL DAILY
Qty: 90 TABLET | Refills: 1 | Status: SHIPPED | OUTPATIENT
Start: 2023-05-11 | End: 2024-05-10

## 2023-05-11 RX ORDER — CLONIDINE HYDROCHLORIDE 0.1 MG/1
0.1 TABLET ORAL EVERY 6 HOURS PRN
Qty: 60 TABLET | Refills: 1 | Status: SHIPPED | OUTPATIENT
Start: 2023-05-11 | End: 2024-05-10

## 2023-05-11 NOTE — TELEPHONE ENCOUNTER
Called and spoke to pt  informed pt needed blood pressure log data that she been collecting. Pt stated on 5/3/23 am reading 153/114 pm reading 148/112,5/5/23 am reading 157/120, pm reading 159/114, 5/06/23 am reading 151/117, pm reading 150/114,5/07/23 am reading 150/115,5/08/23 am reading 149/119,pm reading 150/120, 5/09/23 am reading 167/124 and 5/14/23 am reading 148/112 and pm reading 161/115

## 2023-05-12 ENCOUNTER — TELEPHONE (OUTPATIENT)
Dept: RHEUMATOLOGY | Facility: CLINIC | Age: 48
End: 2023-05-12

## 2023-05-12 NOTE — TELEPHONE ENCOUNTER
Called and spoke with pt to informed her of new prescription Losartan to take daily along with Norvasc as well as Clonidine to use prn for blood pressures greater than 160/100. Instructed to check blood pressure 1 hour after taking morning dose of medication and later in evening. Record date and time of blood pressure readings and bring with f/u apt.Will repeat Kidney functions with f/u apt as well as reassess blood pressures readings. Advised ER precautions  for chest pain ,SOB,h/a's, blood pressure that do not decrease with medications blood pressure greater or equal 180/110 requires prompt ER assessment. Pt verbalized understanding

## 2023-05-12 NOTE — TELEPHONE ENCOUNTER
Ms. LEMUS, please reach out to Ms. Horn in regards to the following:  I spoke with her PCP MsPau Katharina yesterday in regards to her elevated blood pressure readings that we received.  She did send over a new prescription of losartan to take daily along with her Norvasc as well as clonidine to use p.r.n. for blood pressures greater than 160/100.  Please advise her to check her blood pressures 1 hour AFTER taking her morning dose of medication (we want to make sure the medication she is taking is working therefore enc her to check 1 hour after taking meds) and again later in the evening.  Please have her record the date and time of blood pressure readings and bring with her at her follow up apt.  Please re-discuss how to properly take her blood pressure (feet flat on the ground with arm at level of heart on flat surface).  She does have a follow-up appointment scheduled with me on May 30th (Katharina  later reached out to me yesterday evening as she cancelled the appointment made with her PCP due to finances).  We will follow up and repeat kidney functions at this visit as well as reassess her blood pressure readings (remind her to bring b/p log).  I will be in touch with Ms. Posadas after her follow-up visit. Please advise ER precautions for chest pain, sob, dizziness, headaches, or blood pressure that do not decrease with medications (>180/110 requires prompt ER assessment).

## 2023-05-19 PROBLEM — D50.9 IRON DEFICIENCY ANEMIA: Status: ACTIVE | Noted: 2023-05-19

## 2023-05-19 NOTE — PROGRESS NOTES
"  Patient ID: [id number]     Chief Complaint: Rheumatoid arthritis involving multiple sites with positive (Pt sated pain in dustin knee)      HPI:     Nela Shaver is a 47 y.o. female here today for a follow up newly dx RA visit.    The patient is a 47-year-old female who presents today for initial RA/hospital follow up.  She presented to the emergency room March 28, 2023 for complaints of ongoing generalized joint pain, erythema, warmth & swelling. Patient reports symptoms initially began around November of 2022 & have progressively gotten worse since this time. Reports pain is all over, but is worse in neck, back & knees. Seen in this ED 3/7/2023 & had significant elevation of inflammatory markers & knee XRs which showed effusion. Patient was discharged w/ meds for symptom relief & referred to clinic for further evaluation. In clinic, lab workup drawn which showed ESR >130, , anti-CCP pos & RF pos. Patient reported severe weakness in b/l LEs & states she reached the point where she was unable to walk at all over 4 days and presented to the ER for evaluation. Reports that she had essentially been bedbound for 4 days prior to ER visit and was urinating in bed as she could not move due to pain, she denies any history of incontinence. Admits swelling in bilateral hands and knees.  Joint pain worse with activities.  Morning stiffness for more than 3 hours sometimes.     States history of Raynaud's for the last 4 years, symptoms occur during winter, episodes happen 3 to 4 times a week during winter. Does not have color changes all the time.  Never had ulcers in the fingertips or toes. Reports officially diagnosed January 2023, she states warming her hands helps symptoms resolve, no issues with feet, she continues to deny any ulcers on fingers. . She Was started on MTX and tolerating well. She does feel more fatigue lately and describes as being "foggy". She states she is resting well at night time and waking up " "feeling rested. She denies any recent illness and no further hospitalizations. She denies any red/warm/swollen joints at this time. Morning stiffness has subsided.      Today at follow up, she denies any red/warm/swollen joints, denies morning stiffness at this time.  She states overall she is feeling much better, she is continued to take her medication as directed and has completely tapered off her steroids.  She has had a slight increase in bilateral knee pain over the past few weeks, she does feel this is associated to completing her steroid as well as no longer taking her Mobic daily.  She states pain is tolerable and has been using ice to help.  Otherwise she has been doing well.  She is moving to [place] this weekend she recently lost her job and is moving closer to family, she is very excited as her son graduated this past weekend from [employer] and has been very overwhelmed but things are starting to calm down".  Since her last visit she has also stopped smoking due to decrease in stress, congratulated her on her efforts!  She is also concerned about financially being able to continue coming to her appointments with as she is no longer working and is also requesting a referral to establish with a new rheumatologist in [facility] continuation of care.  She denies any recent illnesses or hospitalizations since her last visit and has not had to hold her medication for any reason.  LMP 5/4/23    Denies history of fevers, rashes, photosensitivity, oral or nasal ulcers, h/o MI, stroke, seizures, h/o PE or DVT,  uveitis, malignancies.   Family history of autoimmune disease: none.   Pregnancies:  1 Miscarriages: none.   Smoking: 3/4 pack a week- not a daily smoker- start age 16 y/o. Quit smoking in the last 2 weeks- stress has decreased and no longer feels the need to smoke, congratulated her on this and enc to keep up the good work!     Social History     Tobacco Use   Smoking Status Some Days    Packs/day: " 0.50    Types: Cigarettes   Smokeless Tobacco Never        ----------------------------  Arthritis  Hypertension     Past Surgical History:   Procedure Laterality Date    DILATION AND CURETTAGE OF UTERUS N/A 06/01/1995       Review of patient's allergies indicates:  No Known Allergies    Current Outpatient Medications   Medication Instructions    amLODIPine (NORVASC) 10 mg, Oral, Daily    blood pressure monitor Kit 1 each, Misc.(Non-Drug; Combo Route), 2 times daily    cloNIDine (CATAPRES) 0.1 mg, Oral, Every 6 hours PRN    ergocalciferol (ERGOCALCIFEROL) 50,000 Units, Oral, Every 7 days    ferrous sulfate 325 mg, Oral, Daily    folic acid (FOLVITE) 1 mg, Oral, Daily    losartan (COZAAR) 50 mg, Oral, Daily    methotrexate (TREXALL) 15 mg, Oral, Every 7 days, Hold for fever or infections.       Social History     Socioeconomic History    Marital status: Single   Tobacco Use    Smoking status: Some Days     Packs/day: 0.50     Types: Cigarettes    Smokeless tobacco: Never   Substance and Sexual Activity    Alcohol use: Yes     Comment: occioanlly    Drug use: Never    Sexual activity: Not Currently     Social Determinants of Health     Financial Resource Strain: Low Risk     Difficulty of Paying Living Expenses: Not hard at all   Food Insecurity: No Food Insecurity    Worried About Running Out of Food in the Last Year: Never true    Ran Out of Food in the Last Year: Never true   Transportation Needs: No Transportation Needs    Lack of Transportation (Medical): No    Lack of Transportation (Non-Medical): No   Physical Activity: Inactive    Days of Exercise per Week: 0 days    Minutes of Exercise per Session: 0 min   Stress: No Stress Concern Present    Feeling of Stress : Not at all   Social Connections: Socially Isolated    Frequency of Communication with Friends and Family: Twice a week    Frequency of Social Gatherings with Friends and Family: Twice a week    Attends Yarsani Services: Never    Active Member of  Clubs or Organizations: No    Attends Club or Organization Meetings: Never    Marital Status: Never    Housing Stability: Low Risk     Unable to Pay for Housing in the Last Year: No    Number of Places Lived in the Last Year: 1    Unstable Housing in the Last Year: No        Family History   Problem Relation Age of Onset    Hyperlipidemia Mother           There is no immunization history on file for this patient.    Patient Care Team:  BOY Reaves as PCP - General (Family Medicine)  Tamela Delgadillo LPN as Care Coordinator     Subjective:     ROS    Constitutional:  Denies chills. Denies fever. Denies night sweats. Denies weight loss.   Ophthalmology: Denies blurred vision. Denies dry eyes. Denies eye pain. Denies Itching and redness.   ENT: Denies oral ulcers. Denies epistaxis. Denies dry mouth. Denies swollen glands.   Endocrine: Denies diabetes. Denies thyroid Problems.   Respiratory: Denies cough. Denies shortness of breath. Denies shortness of breath with exertion. Denies hemoptysis.   Cardiovascular: Denies chest pain at rest. Denies chest pain with exertion. Denies palpitations.    Gastrointestinal: Denies abdominal pain. Denies diarrhea. Denies nausea. Denies vomiting. Denies hematemesis or hematochezia. Denies heartburn.  Genitourinary: Denies blood in urine.  Musculoskeletal: See HPI for details  Integumentary: Denies rash. Denies photosensitivity.   Peripheral Vascular: Denies Ulcers of hands and/or feet. Denies Cold extremities.   Neurologic: Denies dizziness. Denies headache.  Denies loss of strength. Denies numbness or tingling.   Psychiatric: Denies depression. Admits some anxiety- stable, she did recently lose her job and moving to [facility] to be with her family but states this is a good thing and she is very excited to be closer to her family. Denies suicidal/homicidal ideations.      Objective:     Visit Vitals  /84 (BP Location: Left arm, Patient Position: Sitting, BP Method:  "Medium (Automatic))   Pulse 74   Temp 97.6 °F (36.4 °C) (Oral)   Resp 20   Ht 5' 4" (1.626 m)   Wt 55.8 kg (123 lb)   SpO2 100%   BMI 21.11 kg/m²         Physical Exam    General Appearance: alert, pleasant, in no acute distress.  Skin: Skin color, texture, turgor normal. No rashes or lesions.  Eyes:  extraocular movement intact (EOMI), pupils equal, round, reactive to light and accommodation, conjunctiva clear.  ENT: No oral or nasal ulcers.  Neck:  Neck supple. No adenopathy.   Lungs: CTA bilaterally without crackles, rhonchi, or wheezes.   Heart: RRR w/o murmurs.  No edema. 2+ DP pulse.  Neuro: Alert, oriented, CN II-XII GI, sensory and motor innervation intact.  Musculoskeletal: No activate synovitis noted to bilateral MCPs, synovial thickening noted to 2nd/3rd MCPs bilaterally, FROM noted to bialteraly wrists, elbows and shoulders with no pain, mild swelling noted to bilateral knees, +crepitus bilaterally, mild pain with ROM, no redness or warmth noted, no pain to bilateral ankles, MTPs, FROM with no pain.   Psych: Alert, oriented, normal eye contact.       Labs Reviewed:     3/28/2023: Sed Rate 73 (<20).  (<5). CPK (low 19) and Aldolase ok. TSH 0.829. Vitamin D 7.1. A1C 5.2. PTH ok. MAGED negative. c-ANCA, p-ANCA negative. C3/C4 ok. CCP Positive. RF IgA >100, IgG 45h, IgM >100. Centromere negative. Quantiferon Indeterminate. HIV nonreactive. Hep B Core IgM Reactive. Hep B Core Ab, Surface Ab and Antigen nonreactive. UA trace protein no blood noted. Lyme serologies negative.  Parvovirus antibody negative.  Uric acid and synovial fluid 3.5.     03/24/2023; C ANCA and p-ANCA negative.  Complements normal.  Anti CCP positive.  Rheumatoid factor IgA greater than 100, IgM greater than 100, IgG 46.  Lyme serologies negative.  Parvovirus antibody negative.  Uric acid and synovial fluid 3.5.  Vitamin-D low 7.1.    03/29/2023; hemoglobin 8.1.  Elevated platelet count 821.  CMP okay.  Glucose elevated to 20.  CPK " low 19.  .      4/27/2023: UPT negative. Vitamin D 26.5. CMP Ca+ 10.3 (previous 10.5) otherwise ok. Sed 25 (<20). CBC hgb 10.8 (previous 9.1), hct 36.1 (previous 31.7), platelet 439 (previous 987). CRP 4.10 (<5). Hep  B DNA undetected.     Rheumatology:  Lab Results   Component Value Date    ANAHS <1:80 (Negative) 03/28/2023    C3 136 03/24/2023    C4 23.7 03/24/2023    CCPANTIBODIE Positive (A) 03/24/2023    SEDRATE 25 (H) 04/27/2023    LABURIC 4.2 03/24/2023        Chemistry:  Lab Results   Component Value Date     04/27/2023    K 4.6 04/27/2023    CHLORIDE 108 (H) 04/27/2023    BUN 14.6 04/27/2023    CREATININE 0.84 04/27/2023    EGFRNORACEVR >60 04/27/2023    GLUCOSE 94 04/27/2023    CALCIUM 10.3 (H) 04/27/2023    ALKPHOS 68 04/27/2023    LABPROT 7.9 04/27/2023    ALBUMIN 3.9 04/27/2023    AST 10 04/27/2023    ALT 8 04/27/2023    MG 2.20 03/30/2023    PHOS 2.6 03/30/2023    LZHCDOUB04IQ 26.5 (L) 04/27/2023        Hematology:  Lab Results   Component Value Date    WBC 7.8 04/27/2023    HGB 10.8 (L) 04/27/2023    HCT 36.1 (L) 04/27/2023     (H) 04/27/2023        Urine:  Lab Results   Component Value Date    COLORUA Light-Yellow 03/28/2023    APPEARANCEUA Clear 03/28/2023    SGUA 1.012 03/28/2023    PHUA 7.5 03/28/2023    PROTEINUA Trace (A) 03/28/2023    GLUCOSEUA Normal 03/28/2023    KETONESUA 1+ (A) 03/28/2023    BLOODUA Negative 03/28/2023    NITRITESUA Negative 03/28/2023    LEUKOCYTESUR 25 (A) 03/28/2023    RBCUA None Seen 03/28/2023    WBCUA 0-5 03/28/2023    BACTERIA Trace (A) 03/28/2023    SQEPUA Occ (A) 03/28/2023    HYALINECASTS None Seen 03/28/2023    CREATRANDUR 360.0 (H) 03/24/2023        Lab Results   Component Value Date    CREATRANDUR 360.0 (H) 03/24/2023        Imaging:   3/2023 XR Abd AP : Impression: No acute abdominal radiographic abnormality.   CXRay:  Impression: No acute abnormality of the chest.  Right Foot Xray: Impression: Minimal degenerative changes. Hallux valgus  deformity.  Left Foot Xray: Impression: No acute osseous abnormality.  Left Hand Xray: Impression: No acute fracture.  Mild degenerative changes of the ulna styloid and distal 5th interphalangeal joint.  Right Hand Xray: Impression: No acute osseous abnormality, fracture, or dislocation. There is no significant degenerative change.  Bilateral Knee Xray:  Impression: Mild degenerative changes of the knees with bilateral joint effusions.   Neck Soft Tissue Xray: Impression: No acute abnormality identified.    Assessment:        ICD-10-CM ICD-9-CM   1. Rheumatoid arthritis involving multiple sites with positive rheumatoid factor  M05.79 714.0   2. Iron deficiency anemia, unspecified iron deficiency anemia type  D50.9 280.9   3. High risk medication use  Z79.899 V58.69   4. Primary hypertension  I10 401.9   5. Vitamin D deficiency  E55.9 268.9          Plan:     1. Rheumatoid arthritis involving multiple sites with positive rheumatoid factor  -Recent dx March 2023 after hospital admission. High titer RF and antiCCP and elevated inflammatory markers.  Improvement of joint pain with steroids. Synovitis in left hand, bilateral knees, ankles and MTPs on exam in hospital. Started on MTX prior to discharge. Today on follow up, she is tolerating MTX 15 mg po qweek with folic acid 1 mg po qd well without any s/e at this time. She has completely weaned off of Prednisone as well as Mobic. Today on exam, no active synovitis to bilateral MCP/MTPs but is having some mild knee pain with mild swelling. Will increase MTX to 20 mg po qd, continue folic acid 1 mg po qd.   -Increase MTX to 20 mg po qweek with folic acid 1 mg po qd  -Referral re-sent to  for contraception management- in [place] to [facility] as she is moving -at length discussion in regards to MTX and pregnancy- she voiced understanding and denies any sexual activity, UPT last month negative  -No lab needed today as she had no previous dose adjustments at her last visit- she  will need repeat lab work in 4 weeks as we did increase her methotrexate today.  I placed an order in the computer and I advised her to go to an [facility] facility in [place] in the next 4 weeks and we will call with her lab results.  -Referral sent to Rheumatology in the [place] area to establish care and follow-up  -She was also enc to walk over to [address] today to apply for assisted care as she is no longer employed and has no current income at this time    2. Anemia, PHAM  -May correlate with heavy menses- continue Iron supplements- has been taking qod, enc to try to take daily if tolerated. Absorbed well with vitamin C, discussed GI s/e and dark stools while taking.   -CBC 4/23 stable  -Referral sent to FP to est care in [facility] due to her move and follow up as well as to discuss birth control    3. High risk medication use  -Persons with rheumatoid arthritis, lupus, psoriatic arthritis and other autoimmune diseases are at increased risk of cardiovascular disease including heart attack and stroke. We recommend that all patients with these conditions have annual health maintenance exams including lipid measurements, blood pressure measurements, and smoking cessation counseling when applicable at their primary care provider's office.  -Advised to stay up-to-date on age appropriate vaccinations and malignancy screening with PCP. She has not had a mammogram/pap in several years- referral made to GYN to get established as well as to start on birth control with MTX- discussed Depo, Implanon, or IUD- advised to avoid OCPs with RA  -Continued lab monitoring.     4. HTN  -Currently at goal, she admits to holding her Norvasc for the past several days as her blood pressures have been on the lower end.  She denies any symptoms of dizziness and is feeling well.  Recent increase in blood pressures may have been associated to steroids as well as daily intake of Mobic, both of which she has stopped over the past 2  weeks.  She voiced compliance with new medication of losartan 50 mg recently ordered by her PCP, she states she has not had to use the clonidine as directed for elevated blood pressures as her pressures have been stable.  Blood pressures from home reviewed and range from 90 over 70s with an average of 110s/70s- which are at goal at this time. I strongly advised her to continue blood pressure monitoring at home, parameters reviewed and discussed at length again.  Referral made for family practice in [facility] to set up an establish care for continued monitoring.  ER precautions advised for any spike in readings greater than 180/110, chest pains, dizziness, etc.      5. Vitamin D Def  -Repeat Vitamin D improved in April/2023, still low however much improved from 7.  Currently noted at 26.5., refill sent, repeat lab can be completed once she is established with [facility] our rheumatology provider  -Continue current Rx for Ergocalciferol 50,000 IU po qweek x 3 months as directed.     No follow-ups on file. In addition to their scheduled follow up, the patient has also been instructed to follow up on as needed basis.  Referral to rheumatology in the [place] area since to establish care as she is moving.    Total time spent with patient and documentation is more than 60 minutes. All questions were answered to patient's satisfaction and patient verbalized understanding.       No orders of the defined types were placed in this encounter.       BOY Ross

## 2023-05-25 ENCOUNTER — OFFICE VISIT (OUTPATIENT)
Dept: RHEUMATOLOGY | Facility: CLINIC | Age: 48
End: 2023-05-25

## 2023-05-25 VITALS
OXYGEN SATURATION: 100 % | HEIGHT: 64 IN | HEART RATE: 74 BPM | DIASTOLIC BLOOD PRESSURE: 84 MMHG | BODY MASS INDEX: 21 KG/M2 | RESPIRATION RATE: 20 BRPM | SYSTOLIC BLOOD PRESSURE: 120 MMHG | WEIGHT: 123 LBS | TEMPERATURE: 98 F

## 2023-05-25 DIAGNOSIS — D50.9 IRON DEFICIENCY ANEMIA, UNSPECIFIED IRON DEFICIENCY ANEMIA TYPE: ICD-10-CM

## 2023-05-25 DIAGNOSIS — E55.9 VITAMIN D DEFICIENCY: ICD-10-CM

## 2023-05-25 DIAGNOSIS — I10 PRIMARY HYPERTENSION: ICD-10-CM

## 2023-05-25 DIAGNOSIS — M05.79 RHEUMATOID ARTHRITIS INVOLVING MULTIPLE SITES WITH POSITIVE RHEUMATOID FACTOR: Primary | ICD-10-CM

## 2023-05-25 DIAGNOSIS — Z79.899 HIGH RISK MEDICATION USE: ICD-10-CM

## 2023-05-25 PROCEDURE — 99215 OFFICE O/P EST HI 40 MIN: CPT | Mod: PBBFAC | Performed by: NURSE PRACTITIONER

## 2023-05-25 PROCEDURE — 99214 PR OFFICE/OUTPT VISIT, EST, LEVL IV, 30-39 MIN: ICD-10-PCS | Mod: S$PBB,,, | Performed by: NURSE PRACTITIONER

## 2023-05-25 PROCEDURE — 99214 OFFICE O/P EST MOD 30 MIN: CPT | Mod: S$PBB,,, | Performed by: NURSE PRACTITIONER

## 2023-05-25 RX ORDER — FOLIC ACID 1 MG/1
1 TABLET ORAL DAILY
Qty: 30 TABLET | Refills: 5 | Status: SHIPPED | OUTPATIENT
Start: 2023-05-25 | End: 2024-05-24

## 2023-05-25 RX ORDER — METHOTREXATE 2.5 MG/1
TABLET ORAL
Qty: 40 TABLET | Refills: 3 | Status: SHIPPED | OUTPATIENT
Start: 2023-05-25

## 2023-05-25 RX ORDER — DICLOFENAC SODIUM 10 MG/G
2 GEL TOPICAL 4 TIMES DAILY
Qty: 100 G | Refills: 5 | Status: SHIPPED | OUTPATIENT
Start: 2023-05-25

## 2023-05-25 RX ORDER — ERGOCALCIFEROL 1.25 MG/1
50000 CAPSULE ORAL
Qty: 4 CAPSULE | Refills: 2 | Status: SHIPPED | OUTPATIENT
Start: 2023-05-25

## 2023-06-22 ENCOUNTER — TELEPHONE (OUTPATIENT)
Dept: RHEUMATOLOGY | Facility: CLINIC | Age: 48
End: 2023-06-22

## 2023-06-22 NOTE — TELEPHONE ENCOUNTER
I attempt to call pt no answer left a voicemail message to call clinic back. This message in regards to her needing to do lab work

## 2023-06-22 NOTE — TELEPHONE ENCOUNTER
"Called and spoke to pt informed before we can refill her MTX she would need blood work  can go to any [facility] in [place] and they will be able to see the lab orders in computer. Remaindered her she will need a rheumatology doctor in [place] . Pt began to scream that's not want ya"ll did not tell me that and hung up the phone. I thought that maybe the call dropped. Tried calling her back went straight to voicemail. Drea was made aware verbally of what happen with phone call.  "

## 2023-06-22 NOTE — TELEPHONE ENCOUNTER
I attempt to call pt no answer left detail voicemail message letting her know she need lab work done before MTX script can be filled

## 2023-06-23 ENCOUNTER — LAB VISIT (OUTPATIENT)
Dept: LAB | Facility: OTHER | Age: 48
End: 2023-06-23

## 2023-06-23 DIAGNOSIS — M05.79 RHEUMATOID ARTHRITIS INVOLVING MULTIPLE SITES WITH POSITIVE RHEUMATOID FACTOR: Primary | ICD-10-CM

## 2023-06-23 DIAGNOSIS — M05.79 RHEUMATOID ARTHRITIS INVOLVING MULTIPLE SITES WITH POSITIVE RHEUMATOID FACTOR: ICD-10-CM

## 2023-06-23 LAB
ALBUMIN SERPL BCP-MCNC: 3.6 G/DL (ref 3.5–5.2)
ALP SERPL-CCNC: 62 U/L (ref 55–135)
ALT SERPL W/O P-5'-P-CCNC: 9 U/L (ref 10–44)
ANION GAP SERPL CALC-SCNC: 9 MMOL/L (ref 8–16)
ANION GAP SERPL CALC-SCNC: 9 MMOL/L (ref 8–16)
AST SERPL-CCNC: 16 U/L (ref 10–40)
BASOPHILS # BLD AUTO: 0.02 K/UL (ref 0–0.2)
BASOPHILS NFR BLD: 0.5 % (ref 0–1.9)
BILIRUB SERPL-MCNC: 0.2 MG/DL (ref 0.1–1)
BUN SERPL-MCNC: 14 MG/DL (ref 6–20)
BUN SERPL-MCNC: 14 MG/DL (ref 6–20)
CALCIUM SERPL-MCNC: 9.9 MG/DL (ref 8.7–10.5)
CALCIUM SERPL-MCNC: 9.9 MG/DL (ref 8.7–10.5)
CHLORIDE SERPL-SCNC: 109 MMOL/L (ref 95–110)
CHLORIDE SERPL-SCNC: 109 MMOL/L (ref 95–110)
CO2 SERPL-SCNC: 22 MMOL/L (ref 23–29)
CO2 SERPL-SCNC: 22 MMOL/L (ref 23–29)
CREAT SERPL-MCNC: 0.8 MG/DL (ref 0.5–1.4)
CREAT SERPL-MCNC: 0.8 MG/DL (ref 0.5–1.4)
DIFFERENTIAL METHOD: ABNORMAL
EOSINOPHIL # BLD AUTO: 0.1 K/UL (ref 0–0.5)
EOSINOPHIL NFR BLD: 2.5 % (ref 0–8)
ERYTHROCYTE [DISTWIDTH] IN BLOOD BY AUTOMATED COUNT: 20.8 % (ref 11.5–14.5)
EST. GFR  (NO RACE VARIABLE): >60 ML/MIN/1.73 M^2
EST. GFR  (NO RACE VARIABLE): >60 ML/MIN/1.73 M^2
GLUCOSE SERPL-MCNC: 78 MG/DL (ref 70–110)
GLUCOSE SERPL-MCNC: 78 MG/DL (ref 70–110)
HCT VFR BLD AUTO: 37 % (ref 37–48.5)
HGB BLD-MCNC: 11.5 G/DL (ref 12–16)
IMM GRANULOCYTES # BLD AUTO: 0.01 K/UL (ref 0–0.04)
IMM GRANULOCYTES NFR BLD AUTO: 0.2 % (ref 0–0.5)
LYMPHOCYTES # BLD AUTO: 1.3 K/UL (ref 1–4.8)
LYMPHOCYTES NFR BLD: 29.1 % (ref 18–48)
MCH RBC QN AUTO: 27.5 PG (ref 27–31)
MCHC RBC AUTO-ENTMCNC: 31.1 G/DL (ref 32–36)
MCV RBC AUTO: 89 FL (ref 82–98)
MONOCYTES # BLD AUTO: 0.3 K/UL (ref 0.3–1)
MONOCYTES NFR BLD: 7.2 % (ref 4–15)
NEUTROPHILS # BLD AUTO: 2.7 K/UL (ref 1.8–7.7)
NEUTROPHILS NFR BLD: 60.5 % (ref 38–73)
NRBC BLD-RTO: 0 /100 WBC
PLATELET # BLD AUTO: 360 K/UL (ref 150–450)
PMV BLD AUTO: 10.1 FL (ref 9.2–12.9)
POTASSIUM SERPL-SCNC: 4.1 MMOL/L (ref 3.5–5.1)
POTASSIUM SERPL-SCNC: 4.1 MMOL/L (ref 3.5–5.1)
PROT SERPL-MCNC: 7.4 G/DL (ref 6–8.4)
RBC # BLD AUTO: 4.18 M/UL (ref 4–5.4)
SODIUM SERPL-SCNC: 140 MMOL/L (ref 136–145)
SODIUM SERPL-SCNC: 140 MMOL/L (ref 136–145)
WBC # BLD AUTO: 4.43 K/UL (ref 3.9–12.7)

## 2023-06-23 PROCEDURE — 80053 COMPREHEN METABOLIC PANEL: CPT

## 2023-06-23 PROCEDURE — 36415 COLL VENOUS BLD VENIPUNCTURE: CPT

## 2023-06-23 PROCEDURE — 85025 COMPLETE CBC W/AUTO DIFF WBC: CPT

## 2023-06-23 NOTE — TELEPHONE ENCOUNTER
Drea notify of pt CMP results. Send refill to [facility] faxed to [facility] @ [phone]

## 2023-07-10 ENCOUNTER — PATIENT MESSAGE (OUTPATIENT)
Dept: ADMINISTRATIVE | Facility: HOSPITAL | Age: 48
End: 2023-07-10

## 2023-07-11 ENCOUNTER — TELEPHONE (OUTPATIENT)
Dept: RHEUMATOLOGY | Facility: CLINIC | Age: 48
End: 2023-07-11

## 2023-07-19 ENCOUNTER — TELEPHONE (OUTPATIENT)
Dept: RHEUMATOLOGY | Facility: CLINIC | Age: 48
End: 2023-07-19

## 2023-07-19 NOTE — TELEPHONE ENCOUNTER
Called [facility] in [place] @ [phone] spoke with Ms Wood stated they tired reaching out to pt no answer. I told  by Ms Kahn I would try reaching out to pt. I attempt to call pt no answer left a detailed voicemail message to call [facility] in [place] @ [phone] to make apt with rheumatology clinic .